# Patient Record
Sex: MALE | Race: WHITE | NOT HISPANIC OR LATINO | Employment: FULL TIME | URBAN - METROPOLITAN AREA
[De-identification: names, ages, dates, MRNs, and addresses within clinical notes are randomized per-mention and may not be internally consistent; named-entity substitution may affect disease eponyms.]

---

## 2017-02-10 ENCOUNTER — APPOINTMENT (OUTPATIENT)
Dept: LAB | Facility: HOSPITAL | Age: 52
End: 2017-02-10
Payer: COMMERCIAL

## 2017-02-10 ENCOUNTER — TRANSCRIBE ORDERS (OUTPATIENT)
Dept: ADMINISTRATIVE | Facility: HOSPITAL | Age: 52
End: 2017-02-10

## 2017-02-10 DIAGNOSIS — D50.9 IRON DEFICIENCY ANEMIA, UNSPECIFIED: ICD-10-CM

## 2017-02-10 DIAGNOSIS — E78.5 HYPERLIPIDEMIA, UNSPECIFIED HYPERLIPIDEMIA TYPE: ICD-10-CM

## 2017-02-10 DIAGNOSIS — E13.8 DIABETES MELLITUS OF OTHER TYPE WITH COMPLICATION: Primary | ICD-10-CM

## 2017-02-10 DIAGNOSIS — E13.8 DIABETES MELLITUS OF OTHER TYPE WITH COMPLICATION: ICD-10-CM

## 2017-02-10 DIAGNOSIS — E03.9 UNSPECIFIED HYPOTHYROIDISM: ICD-10-CM

## 2017-02-10 LAB
ALBUMIN SERPL BCP-MCNC: 3.2 G/DL (ref 3.5–5)
ALP SERPL-CCNC: 60 U/L (ref 46–116)
ALT SERPL W P-5'-P-CCNC: 34 U/L (ref 12–78)
ANION GAP SERPL CALCULATED.3IONS-SCNC: 9 MMOL/L (ref 4–13)
AST SERPL W P-5'-P-CCNC: 26 U/L (ref 5–45)
BACTERIA UR QL AUTO: ABNORMAL /HPF
BASOPHILS # BLD AUTO: 0.02 THOUSANDS/ΜL (ref 0–0.1)
BASOPHILS NFR BLD AUTO: 0 % (ref 0–1)
BILIRUB DIRECT SERPL-MCNC: 0.08 MG/DL (ref 0–0.2)
BILIRUB SERPL-MCNC: 0.4 MG/DL (ref 0.2–1)
BILIRUB UR QL STRIP: NEGATIVE
BUN SERPL-MCNC: 21 MG/DL (ref 5–25)
CALCIUM SERPL-MCNC: 8.9 MG/DL (ref 8.3–10.1)
CHLORIDE SERPL-SCNC: 102 MMOL/L (ref 100–108)
CHOLEST SERPL-MCNC: 207 MG/DL (ref 50–200)
CLARITY UR: CLEAR
CO2 SERPL-SCNC: 27 MMOL/L (ref 21–32)
COLOR UR: YELLOW
CREAT SERPL-MCNC: 0.86 MG/DL (ref 0.6–1.3)
EOSINOPHIL # BLD AUTO: 0.14 THOUSAND/ΜL (ref 0–0.61)
EOSINOPHIL NFR BLD AUTO: 2 % (ref 0–6)
ERYTHROCYTE [DISTWIDTH] IN BLOOD BY AUTOMATED COUNT: 13.4 % (ref 11.6–15.1)
EST. AVERAGE GLUCOSE BLD GHB EST-MCNC: 232 MG/DL
FERRITIN SERPL-MCNC: 110 NG/ML (ref 8–388)
GFR SERPL CREATININE-BSD FRML MDRD: >60 ML/MIN/1.73SQ M
GLUCOSE SERPL-MCNC: 226 MG/DL (ref 65–140)
GLUCOSE UR STRIP-MCNC: ABNORMAL MG/DL
HBA1C MFR BLD: 9.7 % (ref 4.2–6.3)
HCT VFR BLD AUTO: 42.7 % (ref 36.5–49.3)
HDLC SERPL-MCNC: 51 MG/DL (ref 40–60)
HGB BLD-MCNC: 14.2 G/DL (ref 12–17)
HGB UR QL STRIP.AUTO: NEGATIVE
IRON SATN MFR SERPL: 19 %
IRON SERPL-MCNC: 62 UG/DL (ref 65–175)
KETONES UR STRIP-MCNC: ABNORMAL MG/DL
LDLC SERPL CALC-MCNC: 116 MG/DL (ref 0–100)
LEUKOCYTE ESTERASE UR QL STRIP: NEGATIVE
LYMPHOCYTES # BLD AUTO: 1.57 THOUSANDS/ΜL (ref 0.6–4.47)
LYMPHOCYTES NFR BLD AUTO: 23 % (ref 14–44)
MCH RBC QN AUTO: 29.6 PG (ref 26.8–34.3)
MCHC RBC AUTO-ENTMCNC: 33.3 G/DL (ref 31.4–37.4)
MCV RBC AUTO: 89 FL (ref 82–98)
MONOCYTES # BLD AUTO: 0.39 THOUSAND/ΜL (ref 0.17–1.22)
MONOCYTES NFR BLD AUTO: 6 % (ref 4–12)
MUCOUS THREADS UR QL AUTO: ABNORMAL
NEUTROPHILS # BLD AUTO: 4.51 THOUSANDS/ΜL (ref 1.85–7.62)
NEUTS SEG NFR BLD AUTO: 67 % (ref 43–75)
NITRITE UR QL STRIP: NEGATIVE
NON-SQ EPI CELLS URNS QL MICRO: ABNORMAL /HPF
NRBC BLD AUTO-RTO: 0 /100 WBCS
PH UR STRIP.AUTO: 5.5 [PH] (ref 4.5–8)
PLATELET # BLD AUTO: 187 THOUSANDS/UL (ref 149–390)
PMV BLD AUTO: 10.4 FL (ref 8.9–12.7)
POTASSIUM SERPL-SCNC: 4.6 MMOL/L (ref 3.5–5.3)
PROT SERPL-MCNC: 7 G/DL (ref 6.4–8.2)
PROT UR STRIP-MCNC: ABNORMAL MG/DL
PSA SERPL-MCNC: 0.8 NG/ML (ref 0–4)
RBC # BLD AUTO: 4.79 MILLION/UL (ref 3.88–5.62)
RBC #/AREA URNS AUTO: ABNORMAL /HPF
SODIUM SERPL-SCNC: 138 MMOL/L (ref 136–145)
SP GR UR STRIP.AUTO: >=1.03 (ref 1–1.03)
TIBC SERPL-MCNC: 318 UG/DL (ref 250–450)
TRIGL SERPL-MCNC: 200 MG/DL
TSH SERPL DL<=0.05 MIU/L-ACNC: 1.64 UIU/ML (ref 0.36–3.74)
UROBILINOGEN UR QL STRIP.AUTO: 0.2 E.U./DL
WBC # BLD AUTO: 6.7 THOUSAND/UL (ref 4.31–10.16)
WBC #/AREA URNS AUTO: ABNORMAL /HPF

## 2017-02-10 PROCEDURE — 82570 ASSAY OF URINE CREATININE: CPT | Performed by: INTERNAL MEDICINE

## 2017-02-10 PROCEDURE — 84443 ASSAY THYROID STIM HORMONE: CPT

## 2017-02-10 PROCEDURE — 80061 LIPID PANEL: CPT

## 2017-02-10 PROCEDURE — 83036 HEMOGLOBIN GLYCOSYLATED A1C: CPT

## 2017-02-10 PROCEDURE — 85025 COMPLETE CBC W/AUTO DIFF WBC: CPT

## 2017-02-10 PROCEDURE — 36415 COLL VENOUS BLD VENIPUNCTURE: CPT

## 2017-02-10 PROCEDURE — 80048 BASIC METABOLIC PNL TOTAL CA: CPT

## 2017-02-10 PROCEDURE — 82728 ASSAY OF FERRITIN: CPT

## 2017-02-10 PROCEDURE — 82043 UR ALBUMIN QUANTITATIVE: CPT | Performed by: INTERNAL MEDICINE

## 2017-02-10 PROCEDURE — G0103 PSA SCREENING: HCPCS

## 2017-02-10 PROCEDURE — 80076 HEPATIC FUNCTION PANEL: CPT

## 2017-02-10 PROCEDURE — 83540 ASSAY OF IRON: CPT

## 2017-02-10 PROCEDURE — 83550 IRON BINDING TEST: CPT

## 2017-02-10 PROCEDURE — 81001 URINALYSIS AUTO W/SCOPE: CPT | Performed by: INTERNAL MEDICINE

## 2017-02-11 LAB
CREAT UR-MCNC: 141 MG/DL
MICROALBUMIN UR-MCNC: 217 MG/L (ref 0–20)
MICROALBUMIN/CREAT 24H UR: 154 MG/G CREATININE (ref 0–30)

## 2018-01-17 ENCOUNTER — ALLSCRIPTS OFFICE VISIT (OUTPATIENT)
Dept: OTHER | Facility: OTHER | Age: 53
End: 2018-01-17

## 2018-01-23 NOTE — PROCEDURES
Results/Data    Procedure: Electromyogram and Nerve Conduction Study  Indication: Left Upper Extremity   Referred by Dr Addy Hess  The procedure's were discussed with the patient  Written consent was obtained prior to the procedure and is detailed in the patient's record  Prior to the start of the procedure a time out was taken and the identity of the patient was confirmed via name and date of birth with the patient  The correct site and the procedure to be performed were confirmed  The correct side was confirmed if applicable  The positioning of the patient was verified  The availability of the correct equipment was verified  Procedure Start Time: 11:30    Technique: A sterile concentric needle electrode was used  The patient tolerated the procedure well  There were no complications  Results   : The median motor terminal latency was prolonged with a normal compound motor action potential amplitude and a slow conduction velocity across the wrist   The ulnar motor terminal latency was within normal limits with a normal compound motor action potential amplitude and a slow conduction velocity distally and across the elbow  The median and ulnar F-waves were prolonged  The median sensory peak latency was prolonged with a low sensory action potential amplitude  The ulnar sensory peak latency was normal with normal sensory action potential amplitude  The median palmar evoked response was prolonged by 3 0 milliseconds as compared to the ulnar palmar evoked response at the  same distance  Concentric needle examination was performed on various proximal and distal muscles including deltoid, biceps, triceps, FCU, apb, FDI and low cervical paraspinals  There was no evidence of active denervation in any of the muscles tested    Mild decreased recruitment of giant and polyphasic motor units was noted in the abductor pollicis brevis and mild decreased recruitment of polyphasic motor units was noted in the FDI  The compound motor unit action potentials were of normal configuration with interference patterns being full or full for effort in the remaining muscles tested  Interpretation:  There is electrophysiologic evidence of a:    1  Moderate median nerve compression neuropathy at the wrist with demyelinative changes, consistent with a diagnosis of carpal tunnel syndrome  2  Mild ulnar neuropathy at the elbow with demyelinating changes as evidence by the slowing of conduction velocity across the elbow  In addition, moderate ulnar neuropathy at the wrist with demyelinative changes as evidence by the abnormal ulnar motor and sensory nerve conduction studies and chronic denervation changes in the FDI  3   There is no evidence of a cervical radiculopathy  Clinical correlation is recommended        Signatures   Electronically signed by : Marcos Grimes MD; Jan 17 2018 12:34PM EST                       (Author)

## 2018-10-06 ENCOUNTER — APPOINTMENT (OUTPATIENT)
Dept: LAB | Facility: HOSPITAL | Age: 53
End: 2018-10-06
Payer: COMMERCIAL

## 2018-10-06 ENCOUNTER — TRANSCRIBE ORDERS (OUTPATIENT)
Dept: ADMINISTRATIVE | Facility: HOSPITAL | Age: 53
End: 2018-10-06

## 2018-10-06 DIAGNOSIS — E13.8 DIABETES MELLITUS OF OTHER TYPE WITH COMPLICATION, UNSPECIFIED WHETHER LONG TERM INSULIN USE: Primary | ICD-10-CM

## 2018-10-06 DIAGNOSIS — E78.5 HYPERLIPIDEMIA, UNSPECIFIED HYPERLIPIDEMIA TYPE: ICD-10-CM

## 2018-10-06 DIAGNOSIS — E13.8 DIABETES MELLITUS OF OTHER TYPE WITH COMPLICATION, UNSPECIFIED WHETHER LONG TERM INSULIN USE: ICD-10-CM

## 2018-10-06 LAB
ALBUMIN SERPL BCP-MCNC: 3.9 G/DL (ref 3.5–5)
ALP SERPL-CCNC: 74 U/L (ref 46–116)
ALT SERPL W P-5'-P-CCNC: 31 U/L (ref 12–78)
ANION GAP SERPL CALCULATED.3IONS-SCNC: 9 MMOL/L (ref 4–13)
AST SERPL W P-5'-P-CCNC: 12 U/L (ref 5–45)
BILIRUB DIRECT SERPL-MCNC: 0.11 MG/DL (ref 0–0.2)
BILIRUB SERPL-MCNC: 0.4 MG/DL (ref 0.2–1)
BUN SERPL-MCNC: 15 MG/DL (ref 5–25)
CALCIUM SERPL-MCNC: 9.2 MG/DL (ref 8.3–10.1)
CHLORIDE SERPL-SCNC: 100 MMOL/L (ref 100–108)
CHOLEST SERPL-MCNC: 219 MG/DL (ref 50–200)
CO2 SERPL-SCNC: 26 MMOL/L (ref 21–32)
CREAT SERPL-MCNC: 0.87 MG/DL (ref 0.6–1.3)
CREAT UR-MCNC: 52.9 MG/DL
EST. AVERAGE GLUCOSE BLD GHB EST-MCNC: 203 MG/DL
GFR SERPL CREATININE-BSD FRML MDRD: 99 ML/MIN/1.73SQ M
GLUCOSE P FAST SERPL-MCNC: 183 MG/DL (ref 65–99)
HBA1C MFR BLD: 8.7 % (ref 4.2–6.3)
HDLC SERPL-MCNC: 58 MG/DL (ref 40–60)
LDLC SERPL CALC-MCNC: 125 MG/DL (ref 0–100)
MICROALBUMIN UR-MCNC: 92.2 MG/L (ref 0–20)
MICROALBUMIN/CREAT 24H UR: 174 MG/G CREATININE (ref 0–30)
NONHDLC SERPL-MCNC: 161 MG/DL
POTASSIUM SERPL-SCNC: 4.8 MMOL/L (ref 3.5–5.3)
PROT SERPL-MCNC: 8 G/DL (ref 6.4–8.2)
SODIUM SERPL-SCNC: 135 MMOL/L (ref 136–145)
TRIGL SERPL-MCNC: 181 MG/DL

## 2018-10-06 PROCEDURE — 36415 COLL VENOUS BLD VENIPUNCTURE: CPT

## 2018-10-06 PROCEDURE — 80076 HEPATIC FUNCTION PANEL: CPT

## 2018-10-06 PROCEDURE — 80048 BASIC METABOLIC PNL TOTAL CA: CPT

## 2018-10-06 PROCEDURE — 83036 HEMOGLOBIN GLYCOSYLATED A1C: CPT

## 2018-10-06 PROCEDURE — 82043 UR ALBUMIN QUANTITATIVE: CPT | Performed by: INTERNAL MEDICINE

## 2018-10-06 PROCEDURE — 82570 ASSAY OF URINE CREATININE: CPT | Performed by: INTERNAL MEDICINE

## 2018-10-06 PROCEDURE — 80061 LIPID PANEL: CPT

## 2019-02-09 ENCOUNTER — APPOINTMENT (OUTPATIENT)
Dept: LAB | Facility: HOSPITAL | Age: 54
End: 2019-02-09
Payer: COMMERCIAL

## 2019-02-09 ENCOUNTER — TRANSCRIBE ORDERS (OUTPATIENT)
Dept: ADMINISTRATIVE | Facility: HOSPITAL | Age: 54
End: 2019-02-09

## 2019-02-09 DIAGNOSIS — N52.8 MIXED ERECTILE DYSFUNCTION: Primary | ICD-10-CM

## 2019-02-09 DIAGNOSIS — N52.8 MIXED ERECTILE DYSFUNCTION: ICD-10-CM

## 2019-02-09 PROCEDURE — 84402 ASSAY OF FREE TESTOSTERONE: CPT

## 2019-02-09 PROCEDURE — 36415 COLL VENOUS BLD VENIPUNCTURE: CPT

## 2019-02-09 PROCEDURE — 84403 ASSAY OF TOTAL TESTOSTERONE: CPT

## 2019-02-12 LAB
TESTOST FREE SERPL-MCNC: 6.2 PG/ML (ref 7.2–24)
TESTOST SERPL-MCNC: 296 NG/DL (ref 264–916)

## 2019-05-18 ENCOUNTER — TRANSCRIBE ORDERS (OUTPATIENT)
Dept: ADMINISTRATIVE | Facility: HOSPITAL | Age: 54
End: 2019-05-18

## 2019-05-18 ENCOUNTER — APPOINTMENT (OUTPATIENT)
Dept: LAB | Facility: HOSPITAL | Age: 54
End: 2019-05-18
Payer: COMMERCIAL

## 2019-05-18 DIAGNOSIS — E03.9 MYXEDEMA HEART DISEASE: ICD-10-CM

## 2019-05-18 DIAGNOSIS — Z12.5 SPECIAL SCREENING FOR MALIGNANT NEOPLASM OF PROSTATE: ICD-10-CM

## 2019-05-18 DIAGNOSIS — E11.9 DIABETES MELLITUS WITHOUT COMPLICATION (HCC): ICD-10-CM

## 2019-05-18 DIAGNOSIS — E11.9 DIABETES MELLITUS WITHOUT COMPLICATION (HCC): Primary | ICD-10-CM

## 2019-05-18 DIAGNOSIS — N52.8 MIXED ERECTILE DYSFUNCTION: ICD-10-CM

## 2019-05-18 DIAGNOSIS — E78.00 PURE HYPERCHOLESTEROLEMIA: ICD-10-CM

## 2019-05-18 DIAGNOSIS — I51.9 MYXEDEMA HEART DISEASE: ICD-10-CM

## 2019-05-18 LAB
ALBUMIN SERPL BCP-MCNC: 3.5 G/DL (ref 3.5–5)
ALP SERPL-CCNC: 65 U/L (ref 46–116)
ALT SERPL W P-5'-P-CCNC: 26 U/L (ref 12–78)
ANION GAP SERPL CALCULATED.3IONS-SCNC: 8 MMOL/L (ref 4–13)
AST SERPL W P-5'-P-CCNC: 12 U/L (ref 5–45)
BACTERIA UR QL AUTO: NORMAL /HPF
BASOPHILS # BLD AUTO: 0.02 THOUSANDS/ΜL (ref 0–0.1)
BASOPHILS NFR BLD AUTO: 0 % (ref 0–1)
BILIRUB DIRECT SERPL-MCNC: 0.1 MG/DL (ref 0–0.2)
BILIRUB SERPL-MCNC: 0.4 MG/DL (ref 0.2–1)
BILIRUB UR QL STRIP: NEGATIVE
BUN SERPL-MCNC: 19 MG/DL (ref 5–25)
CALCIUM SERPL-MCNC: 9.2 MG/DL (ref 8.3–10.1)
CHLORIDE SERPL-SCNC: 103 MMOL/L (ref 100–108)
CHOLEST SERPL-MCNC: 200 MG/DL (ref 50–200)
CLARITY UR: CLEAR
CO2 SERPL-SCNC: 27 MMOL/L (ref 21–32)
COLOR UR: YELLOW
CREAT SERPL-MCNC: 0.82 MG/DL (ref 0.6–1.3)
CREAT UR-MCNC: 53.2 MG/DL
EOSINOPHIL # BLD AUTO: 0.1 THOUSAND/ΜL (ref 0–0.61)
EOSINOPHIL NFR BLD AUTO: 2 % (ref 0–6)
ERYTHROCYTE [DISTWIDTH] IN BLOOD BY AUTOMATED COUNT: 13.7 % (ref 11.6–15.1)
EST. AVERAGE GLUCOSE BLD GHB EST-MCNC: 209 MG/DL
FERRITIN SERPL-MCNC: 88 NG/ML (ref 8–388)
GFR SERPL CREATININE-BSD FRML MDRD: 101 ML/MIN/1.73SQ M
GLUCOSE P FAST SERPL-MCNC: 176 MG/DL (ref 65–99)
GLUCOSE UR STRIP-MCNC: ABNORMAL MG/DL
HBA1C MFR BLD: 8.9 % (ref 4.2–6.3)
HCT VFR BLD AUTO: 47.4 % (ref 36.5–49.3)
HDLC SERPL-MCNC: 51 MG/DL (ref 40–60)
HGB BLD-MCNC: 15.4 G/DL (ref 12–17)
HGB UR QL STRIP.AUTO: NEGATIVE
IMM GRANULOCYTES # BLD AUTO: 0.06 THOUSAND/UL (ref 0–0.2)
IMM GRANULOCYTES NFR BLD AUTO: 1 % (ref 0–2)
IRON SATN MFR SERPL: 19 %
IRON SERPL-MCNC: 63 UG/DL (ref 65–175)
KETONES UR STRIP-MCNC: NEGATIVE MG/DL
LDLC SERPL CALC-MCNC: 103 MG/DL (ref 0–100)
LEUKOCYTE ESTERASE UR QL STRIP: ABNORMAL
LYMPHOCYTES # BLD AUTO: 1.77 THOUSANDS/ΜL (ref 0.6–4.47)
LYMPHOCYTES NFR BLD AUTO: 26 % (ref 14–44)
MCH RBC QN AUTO: 29.9 PG (ref 26.8–34.3)
MCHC RBC AUTO-ENTMCNC: 32.5 G/DL (ref 31.4–37.4)
MCV RBC AUTO: 92 FL (ref 82–98)
MICROALBUMIN UR-MCNC: 112 MG/L (ref 0–20)
MICROALBUMIN/CREAT 24H UR: 211 MG/G CREATININE (ref 0–30)
MONOCYTES # BLD AUTO: 0.47 THOUSAND/ΜL (ref 0.17–1.22)
MONOCYTES NFR BLD AUTO: 7 % (ref 4–12)
NEUTROPHILS # BLD AUTO: 4.42 THOUSANDS/ΜL (ref 1.85–7.62)
NEUTS SEG NFR BLD AUTO: 64 % (ref 43–75)
NITRITE UR QL STRIP: NEGATIVE
NON-SQ EPI CELLS URNS QL MICRO: NORMAL /HPF
NONHDLC SERPL-MCNC: 149 MG/DL
NRBC BLD AUTO-RTO: 0 /100 WBCS
PH UR STRIP.AUTO: 5.5 [PH]
PLATELET # BLD AUTO: 179 THOUSANDS/UL (ref 149–390)
PMV BLD AUTO: 10.8 FL (ref 8.9–12.7)
POTASSIUM SERPL-SCNC: 4.8 MMOL/L (ref 3.5–5.3)
PROT SERPL-MCNC: 7.4 G/DL (ref 6.4–8.2)
PROT UR STRIP-MCNC: NEGATIVE MG/DL
PSA SERPL-MCNC: 1 NG/ML (ref 0–4)
RBC # BLD AUTO: 5.15 MILLION/UL (ref 3.88–5.62)
RBC #/AREA URNS AUTO: NORMAL /HPF
SODIUM SERPL-SCNC: 138 MMOL/L (ref 136–145)
SP GR UR STRIP.AUTO: 1.02 (ref 1–1.03)
T4 FREE SERPL-MCNC: 1.04 NG/DL (ref 0.76–1.46)
TIBC SERPL-MCNC: 339 UG/DL (ref 250–450)
TRIGL SERPL-MCNC: 229 MG/DL
TSH SERPL DL<=0.05 MIU/L-ACNC: 1.17 UIU/ML (ref 0.36–3.74)
UROBILINOGEN UR QL STRIP.AUTO: 0.2 E.U./DL
WBC # BLD AUTO: 6.84 THOUSAND/UL (ref 4.31–10.16)
WBC #/AREA URNS AUTO: NORMAL /HPF

## 2019-05-18 PROCEDURE — 83540 ASSAY OF IRON: CPT

## 2019-05-18 PROCEDURE — 84402 ASSAY OF FREE TESTOSTERONE: CPT

## 2019-05-18 PROCEDURE — 84439 ASSAY OF FREE THYROXINE: CPT

## 2019-05-18 PROCEDURE — 80061 LIPID PANEL: CPT

## 2019-05-18 PROCEDURE — 85025 COMPLETE CBC W/AUTO DIFF WBC: CPT

## 2019-05-18 PROCEDURE — 84153 ASSAY OF PSA TOTAL: CPT

## 2019-05-18 PROCEDURE — 36415 COLL VENOUS BLD VENIPUNCTURE: CPT

## 2019-05-18 PROCEDURE — 80076 HEPATIC FUNCTION PANEL: CPT

## 2019-05-18 PROCEDURE — 82043 UR ALBUMIN QUANTITATIVE: CPT | Performed by: INTERNAL MEDICINE

## 2019-05-18 PROCEDURE — 82570 ASSAY OF URINE CREATININE: CPT | Performed by: INTERNAL MEDICINE

## 2019-05-18 PROCEDURE — 84403 ASSAY OF TOTAL TESTOSTERONE: CPT

## 2019-05-18 PROCEDURE — 83550 IRON BINDING TEST: CPT

## 2019-05-18 PROCEDURE — 83036 HEMOGLOBIN GLYCOSYLATED A1C: CPT

## 2019-05-18 PROCEDURE — 84443 ASSAY THYROID STIM HORMONE: CPT

## 2019-05-18 PROCEDURE — 81001 URINALYSIS AUTO W/SCOPE: CPT | Performed by: INTERNAL MEDICINE

## 2019-05-18 PROCEDURE — 80048 BASIC METABOLIC PNL TOTAL CA: CPT

## 2019-05-18 PROCEDURE — 82728 ASSAY OF FERRITIN: CPT

## 2019-05-21 LAB
TESTOST FREE SERPL-MCNC: 7.6 PG/ML (ref 7.2–24)
TESTOST SERPL-MCNC: 216 NG/DL (ref 264–916)

## 2019-08-30 ENCOUNTER — APPOINTMENT (OUTPATIENT)
Dept: LAB | Facility: HOSPITAL | Age: 54
End: 2019-08-30
Payer: COMMERCIAL

## 2019-08-30 ENCOUNTER — TRANSCRIBE ORDERS (OUTPATIENT)
Dept: ADMINISTRATIVE | Facility: HOSPITAL | Age: 54
End: 2019-08-30

## 2019-08-30 DIAGNOSIS — N52.8 MIXED ERECTILE DYSFUNCTION: ICD-10-CM

## 2019-08-30 DIAGNOSIS — E11.9 DIABETES MELLITUS WITHOUT COMPLICATION (HCC): ICD-10-CM

## 2019-08-30 DIAGNOSIS — E78.5 HYPERLIPOPROTEINEMIA: ICD-10-CM

## 2019-08-30 DIAGNOSIS — E11.9 DIABETES MELLITUS WITHOUT COMPLICATION (HCC): Primary | ICD-10-CM

## 2019-08-30 LAB
ALBUMIN SERPL BCP-MCNC: 3.4 G/DL (ref 3.5–5)
ALP SERPL-CCNC: 61 U/L (ref 46–116)
ALT SERPL W P-5'-P-CCNC: 18 U/L (ref 12–78)
ANION GAP SERPL CALCULATED.3IONS-SCNC: 9 MMOL/L (ref 4–13)
AST SERPL W P-5'-P-CCNC: 14 U/L (ref 5–45)
BILIRUB DIRECT SERPL-MCNC: 0.09 MG/DL (ref 0–0.2)
BILIRUB SERPL-MCNC: 0.4 MG/DL (ref 0.2–1)
BUN SERPL-MCNC: 24 MG/DL (ref 5–25)
CALCIUM SERPL-MCNC: 8.8 MG/DL (ref 8.3–10.1)
CHLORIDE SERPL-SCNC: 101 MMOL/L (ref 100–108)
CHOLEST SERPL-MCNC: 164 MG/DL (ref 50–200)
CO2 SERPL-SCNC: 25 MMOL/L (ref 21–32)
CREAT SERPL-MCNC: 0.72 MG/DL (ref 0.6–1.3)
CREAT UR-MCNC: 103 MG/DL
EST. AVERAGE GLUCOSE BLD GHB EST-MCNC: 192 MG/DL
GFR SERPL CREATININE-BSD FRML MDRD: 107 ML/MIN/1.73SQ M
GLUCOSE P FAST SERPL-MCNC: 179 MG/DL (ref 65–99)
HBA1C MFR BLD: 8.3 % (ref 4.2–6.3)
HDLC SERPL-MCNC: 47 MG/DL (ref 40–60)
LDLC SERPL CALC-MCNC: 83 MG/DL (ref 0–100)
MICROALBUMIN UR-MCNC: 101 MG/L (ref 0–20)
MICROALBUMIN/CREAT 24H UR: 98 MG/G CREATININE (ref 0–30)
NONHDLC SERPL-MCNC: 117 MG/DL
POTASSIUM SERPL-SCNC: 4.3 MMOL/L (ref 3.5–5.3)
PROT SERPL-MCNC: 7.1 G/DL (ref 6.4–8.2)
SODIUM SERPL-SCNC: 135 MMOL/L (ref 136–145)
TRIGL SERPL-MCNC: 168 MG/DL

## 2019-08-30 PROCEDURE — 36415 COLL VENOUS BLD VENIPUNCTURE: CPT

## 2019-08-30 PROCEDURE — 82570 ASSAY OF URINE CREATININE: CPT | Performed by: INTERNAL MEDICINE

## 2019-08-30 PROCEDURE — 80061 LIPID PANEL: CPT

## 2019-08-30 PROCEDURE — 82043 UR ALBUMIN QUANTITATIVE: CPT | Performed by: INTERNAL MEDICINE

## 2019-08-30 PROCEDURE — 84403 ASSAY OF TOTAL TESTOSTERONE: CPT

## 2019-08-30 PROCEDURE — 80048 BASIC METABOLIC PNL TOTAL CA: CPT

## 2019-08-30 PROCEDURE — 83036 HEMOGLOBIN GLYCOSYLATED A1C: CPT

## 2019-08-30 PROCEDURE — 84402 ASSAY OF FREE TESTOSTERONE: CPT

## 2019-08-30 PROCEDURE — 80076 HEPATIC FUNCTION PANEL: CPT

## 2019-08-31 LAB
TESTOST FREE SERPL-MCNC: 9.6 PG/ML (ref 7.2–24)
TESTOST SERPL-MCNC: 315 NG/DL (ref 264–916)

## 2020-11-21 ENCOUNTER — TRANSCRIBE ORDERS (OUTPATIENT)
Dept: ADMINISTRATIVE | Facility: HOSPITAL | Age: 55
End: 2020-11-21

## 2020-11-21 ENCOUNTER — LAB (OUTPATIENT)
Dept: LAB | Facility: HOSPITAL | Age: 55
End: 2020-11-21
Payer: COMMERCIAL

## 2020-11-21 DIAGNOSIS — E13.69 OTHER SPECIFIED DIABETES MELLITUS WITH OTHER SPECIFIED COMPLICATION, UNSPECIFIED WHETHER LONG TERM INSULIN USE (HCC): Primary | ICD-10-CM

## 2020-11-21 DIAGNOSIS — E13.69 OTHER SPECIFIED DIABETES MELLITUS WITH OTHER SPECIFIED COMPLICATION, UNSPECIFIED WHETHER LONG TERM INSULIN USE (HCC): ICD-10-CM

## 2020-11-21 DIAGNOSIS — E78.5 HYPERLIPIDEMIA, UNSPECIFIED HYPERLIPIDEMIA TYPE: ICD-10-CM

## 2020-11-21 DIAGNOSIS — N52.8 MIXED ERECTILE DYSFUNCTION: ICD-10-CM

## 2020-11-21 DIAGNOSIS — N52.8 MIXED ERECTILE DYSFUNCTION: Primary | ICD-10-CM

## 2020-11-21 LAB
ALBUMIN SERPL BCP-MCNC: 3.6 G/DL (ref 3.5–5)
ALP SERPL-CCNC: 67 U/L (ref 46–116)
ALT SERPL W P-5'-P-CCNC: 36 U/L (ref 12–78)
ANION GAP SERPL CALCULATED.3IONS-SCNC: 9 MMOL/L (ref 4–13)
AST SERPL W P-5'-P-CCNC: 14 U/L (ref 5–45)
BILIRUB DIRECT SERPL-MCNC: 0.13 MG/DL (ref 0–0.2)
BILIRUB SERPL-MCNC: 0.5 MG/DL (ref 0.2–1)
BUN SERPL-MCNC: 20 MG/DL (ref 5–25)
CALCIUM SERPL-MCNC: 9.1 MG/DL (ref 8.3–10.1)
CHLORIDE SERPL-SCNC: 101 MMOL/L (ref 100–108)
CHOLEST SERPL-MCNC: 190 MG/DL (ref 50–200)
CO2 SERPL-SCNC: 27 MMOL/L (ref 21–32)
CREAT SERPL-MCNC: 0.85 MG/DL (ref 0.6–1.3)
CREAT UR-MCNC: 95.6 MG/DL
EST. AVERAGE GLUCOSE BLD GHB EST-MCNC: 246 MG/DL
GFR SERPL CREATININE-BSD FRML MDRD: 98 ML/MIN/1.73SQ M
GLUCOSE P FAST SERPL-MCNC: 231 MG/DL (ref 65–99)
HBA1C MFR BLD: 10.2 %
HDLC SERPL-MCNC: 54 MG/DL
LDLC SERPL CALC-MCNC: 100 MG/DL (ref 0–100)
MICROALBUMIN UR-MCNC: 129 MG/L (ref 0–20)
MICROALBUMIN/CREAT 24H UR: 135 MG/G CREATININE (ref 0–30)
POTASSIUM SERPL-SCNC: 4.5 MMOL/L (ref 3.5–5.3)
PROT SERPL-MCNC: 7.4 G/DL (ref 6.4–8.2)
SODIUM SERPL-SCNC: 137 MMOL/L (ref 136–145)
TRIGL SERPL-MCNC: 182 MG/DL

## 2020-11-21 PROCEDURE — 83036 HEMOGLOBIN GLYCOSYLATED A1C: CPT

## 2020-11-21 PROCEDURE — 82043 UR ALBUMIN QUANTITATIVE: CPT | Performed by: INTERNAL MEDICINE

## 2020-11-21 PROCEDURE — 84402 ASSAY OF FREE TESTOSTERONE: CPT

## 2020-11-21 PROCEDURE — 36415 COLL VENOUS BLD VENIPUNCTURE: CPT

## 2020-11-21 PROCEDURE — 80076 HEPATIC FUNCTION PANEL: CPT

## 2020-11-21 PROCEDURE — 80048 BASIC METABOLIC PNL TOTAL CA: CPT

## 2020-11-21 PROCEDURE — 82570 ASSAY OF URINE CREATININE: CPT | Performed by: INTERNAL MEDICINE

## 2020-11-21 PROCEDURE — 84403 ASSAY OF TOTAL TESTOSTERONE: CPT

## 2020-11-21 PROCEDURE — 80061 LIPID PANEL: CPT

## 2020-11-24 LAB
TESTOST FREE SERPL-MCNC: 11.9 PG/ML (ref 7.2–24)
TESTOST SERPL-MCNC: 237 NG/DL (ref 264–916)

## 2021-01-25 ENCOUNTER — NURSE TRIAGE (OUTPATIENT)
Dept: OTHER | Facility: OTHER | Age: 56
End: 2021-01-25

## 2021-01-25 DIAGNOSIS — Z20.828 SARS-ASSOCIATED CORONAVIRUS EXPOSURE: Primary | ICD-10-CM

## 2021-01-25 NOTE — TELEPHONE ENCOUNTER
1  Were you within 6 feet or less, for up to 15 minutes or more with a person that has a confirmed COVID-19 test?   Daughter tested positive Saturday  She became symptomatic on 1/22/2021    2  What was the date of your exposure? Lives in same house  3  Are you experiencing any symptoms attributed to the virus?  (Assess for SOB, cough, fever, difficulty breathing)   Asymptomatic  4  HIGH RISK: Do you have any history heart or lung conditions, weakened immune system, diabetes, Asthma, CHF, HIV, COPD, Chemo, renal failure, sickle cell, etc?   Type II Diabetes

## 2021-01-25 NOTE — TELEPHONE ENCOUNTER
Regarding: Covid- exposure   ----- Message from Ramo Claire sent at 1/25/2021  9:30 AM EST -----  " I am calling to see if I can get a covid test as my daughter who lives in our house has tested postive on Saturday   I know you have to wait 4 or 5 days to get tested but I was wondering if I could get a script put in to get tested for later this week "

## 2021-01-25 NOTE — TELEPHONE ENCOUNTER
Reason for Disposition   [1] COVID-19 EXPOSURE (Close Contact) AND [2] within last 14 days BUT [3] NO symptoms    Protocols used: CORONAVIRUS (COVID-19) EXPOSURE-ADULT-[place]

## 2022-01-28 ENCOUNTER — APPOINTMENT (OUTPATIENT)
Dept: LAB | Facility: HOSPITAL | Age: 57
End: 2022-01-28
Payer: COMMERCIAL

## 2022-01-28 DIAGNOSIS — E04.1 NONTOXIC UNINODULAR GOITER: ICD-10-CM

## 2022-01-28 DIAGNOSIS — I10 ESSENTIAL HYPERTENSION, MALIGNANT: ICD-10-CM

## 2022-01-28 DIAGNOSIS — N52.8 MIXED ERECTILE DYSFUNCTION: ICD-10-CM

## 2022-01-28 DIAGNOSIS — Z12.5 SPECIAL SCREENING FOR MALIGNANT NEOPLASM OF PROSTATE: Primary | ICD-10-CM

## 2022-01-28 DIAGNOSIS — E78.5 HYPERLIPIDEMIA, UNSPECIFIED HYPERLIPIDEMIA TYPE: ICD-10-CM

## 2022-01-28 DIAGNOSIS — E13.69 OTHER SPECIFIED DIABETES MELLITUS WITH OTHER SPECIFIED COMPLICATION, UNSPECIFIED WHETHER LONG TERM INSULIN USE (HCC): ICD-10-CM

## 2022-01-28 DIAGNOSIS — E11.65 UNCONTROLLED TYPE 2 DIABETES MELLITUS WITH HYPERGLYCEMIA (HCC): ICD-10-CM

## 2022-01-28 LAB
ALBUMIN SERPL BCP-MCNC: 3.7 G/DL (ref 3.5–5)
ALP SERPL-CCNC: 62 U/L (ref 46–116)
ALT SERPL W P-5'-P-CCNC: 32 U/L (ref 12–78)
ANION GAP SERPL CALCULATED.3IONS-SCNC: 8 MMOL/L (ref 4–13)
AST SERPL W P-5'-P-CCNC: 16 U/L (ref 5–45)
BILIRUB DIRECT SERPL-MCNC: 0.07 MG/DL (ref 0–0.2)
BILIRUB SERPL-MCNC: 0.37 MG/DL (ref 0.2–1)
BUN SERPL-MCNC: 25 MG/DL (ref 5–25)
CALCIUM SERPL-MCNC: 9.1 MG/DL (ref 8.3–10.1)
CHLORIDE SERPL-SCNC: 100 MMOL/L (ref 100–108)
CHOLEST SERPL-MCNC: 185 MG/DL
CO2 SERPL-SCNC: 28 MMOL/L (ref 21–32)
CREAT SERPL-MCNC: 1 MG/DL (ref 0.6–1.3)
EST. AVERAGE GLUCOSE BLD GHB EST-MCNC: 157 MG/DL
GFR SERPL CREATININE-BSD FRML MDRD: 83 ML/MIN/1.73SQ M
GLUCOSE P FAST SERPL-MCNC: 121 MG/DL (ref 65–99)
HBA1C MFR BLD: 7.1 %
HDLC SERPL-MCNC: 50 MG/DL
LDLC SERPL CALC-MCNC: 103 MG/DL (ref 0–100)
NONHDLC SERPL-MCNC: 135 MG/DL
POTASSIUM SERPL-SCNC: 4.7 MMOL/L (ref 3.5–5.3)
PROT SERPL-MCNC: 7.5 G/DL (ref 6.4–8.2)
PSA SERPL-MCNC: 0.8 NG/ML (ref 0–4)
SODIUM SERPL-SCNC: 136 MMOL/L (ref 136–145)
TRIGL SERPL-MCNC: 159 MG/DL
TSH SERPL DL<=0.05 MIU/L-ACNC: 1.52 UIU/ML (ref 0.36–3.74)

## 2022-01-28 PROCEDURE — G0103 PSA SCREENING: HCPCS

## 2022-01-28 PROCEDURE — 84681 ASSAY OF C-PEPTIDE: CPT

## 2022-01-28 PROCEDURE — 82248 BILIRUBIN DIRECT: CPT

## 2022-01-28 PROCEDURE — 84443 ASSAY THYROID STIM HORMONE: CPT

## 2022-01-28 PROCEDURE — 36415 COLL VENOUS BLD VENIPUNCTURE: CPT

## 2022-01-28 PROCEDURE — 80061 LIPID PANEL: CPT

## 2022-01-28 PROCEDURE — 84402 ASSAY OF FREE TESTOSTERONE: CPT

## 2022-01-28 PROCEDURE — 80053 COMPREHEN METABOLIC PANEL: CPT

## 2022-01-28 PROCEDURE — 84403 ASSAY OF TOTAL TESTOSTERONE: CPT

## 2022-01-28 PROCEDURE — 83036 HEMOGLOBIN GLYCOSYLATED A1C: CPT

## 2022-01-29 LAB — C PEPTIDE SERPL-MCNC: 7.5 NG/ML (ref 1.1–4.4)

## 2022-01-30 LAB
TESTOST FREE SERPL-MCNC: 12.5 PG/ML (ref 7.2–24)
TESTOST SERPL-MCNC: 319 NG/DL (ref 264–916)

## 2022-02-15 ENCOUNTER — CLINICAL SUPPORT (OUTPATIENT)
Dept: NUTRITION | Facility: HOSPITAL | Age: 57
End: 2022-02-15
Payer: COMMERCIAL

## 2022-02-15 DIAGNOSIS — E11.9 DIABETES MELLITUS WITHOUT COMPLICATION (HCC): ICD-10-CM

## 2022-02-15 PROCEDURE — 97802 MEDICAL NUTRITION INDIV IN: CPT

## 2022-02-15 NOTE — PROGRESS NOTES
Initial Nutrition Assessment Form    Patient Name: Blanca Rosen    YOB: 1965    Sex: Male     Assessment Date: 2/15/2022  Start Time: 1300 Stop Time: 1400 Total Minutes: 60     Data:  Present at session: self   Parent/Patient Concerns: "My Doctor sent me, working on my weight, my blood sugars"   Medical Dx/Reason for Referral:   E11 9 Type 2 Diabetes Mellitus without complication       No past medical history on file  No current outpatient medications on file  No current facility-administered medications for this visit  Additional Meds/Supplements: None   Special Learning Needs: None   Height: HC Readings from Last 5 Encounters:   No data found for [facility]    6'2" per Patient report   Weight: Wt Readings from Last 10 Encounters:   No data found for Wt   323 lb per Patient report  There is no height or weight on file to calculate BMI  Recent Weight Change: []Yes     [x]No  Amount:       Energy Needs: [address] [facility] [address] [facility] Equation:  [id number] calories to maintain weight (2600 calorie diet recommended to support desirable weight loss and more healthful weight)   Not on File    Social History     Substance and Sexual Activity   Alcohol Use Not on file       Social History     Tobacco Use   Smoking Status Not on file   Smokeless Tobacco Not on file       Who shops? patient   Who cooks? patient   Exercise:    Prior Counseling?  []Yes     [x]No  When:      Why:         Diet Hx:  Breakfast: Day 1: Bagel, cream cheese, 20 oz coffee with nonfat, sugar-free Hazelnut creamer  Day 2: 3 eggs, 20 oz coffee with nonfat, sugar-free creamer  Day 3: Fat-free muffin, 40 oz coffee with nonfat milk, sugar-free creamer   Lunch: Day 1: Bran Muffin, 20 oz coffee with nonfat, sugar-free creamer  Day 2: 2 slices pizza, 20 oz unsweet iced tea  Day 3: 1 chicken cutlet   Dinner: Day 1: Shrimp pan-kim  Day 2: did not record  Day 3: Prime Rib, Potatoes, Rice, 20 oz unsweet ice tea    Drinks 20 oz water x 3 daily (60 oz/day); Drinks 20 oz unsweet ice tea x 3 daily (60 oz/day)        Nutrition Diagnosis:   Food and nutrition related knowledge deficit related to Lack of prior exposure to accurate nutrition related information as evidenced by Kevin inaccurate or incomplete information       Medical Nutrition Therapy Intervention:  [x]Individualized Meal Plan [x]Understanding Lab Values   []Basic Pathophysiology of Disease []Food/Medication Interactions   [x]Food Diary [x]Exercise   [x]Lifestyle/Behavior Modification Techniques []Medication, Mechanism of Action   [x]Label Reading [x]Self Blood Glucose Monitoring   [x]Weight/BMI Goals []Other -    Other Notes/Assessment: Patient's overall intakes within desired calorie goals per recorded 3 day Food Record, in fact not eating very much most days, however, overall quality of diet very limited and not well balanced  Reviewed MyPlate plan for 9486 calories, including portion sizes for each Food Group, portions for Carbohydrates and more healthful Carbohydrate choices, importance of balanced healthful meals to help with Blood Glucose control, weight control, and nutritional needs  Patient verbalized good understanding and wishes to make Follow-Up Visit in April-May 2022  Comprehension: []Excellent  []Very Good  [x]Good  []Fair   []Poor    Receptivity: []Excellent  [x]Very Good  []Good  []Fair   []Poor    Expected Compliance: []Excellent  [x]Very Good  []Good  []Fair   []Poor        Goals:  1  Increase fresh fruit to 2 servings/day, and pair with lean protein to help stabilize BG levels  2  Increase salads, fresh vegetables to at least 2 servings/day  Enjoys Caesar Salads, likes spinach, mushrooms, tomatoes  3  Limit meats/chicken/fish/eggs to 6-7 oz/day  No follow-ups on file    Labs:  CMP  Lab Results   Component Value Date    K 4 7 01/28/2022     01/28/2022    CO2 28 01/28/2022    BUN 25 01/28/2022    CREATININE 1 00 01/28/2022    GLUF 121 (H) 01/28/2022 CALCIUM 9 1 01/28/2022    AST 16 01/28/2022    ALT 32 01/28/2022    ALKPHOS 62 01/28/2022    EGFR 83 01/28/2022       BMP  Lab Results   Component Value Date    CALCIUM 9 1 01/28/2022    K 4 7 01/28/2022    CO2 28 01/28/2022     01/28/2022    BUN 25 01/28/2022    CREATININE 1 00 01/28/2022       Lipids  No results found for: CHOL  Lab Results   Component Value Date    HDL 50 01/28/2022    HDL 54 11/21/2020    HDL 47 08/30/2019     Lab Results   Component Value Date    LDLCALC 103 (H) 01/28/2022    LDLCALC 100 11/21/2020    LDLCALC 83 08/30/2019     Lab Results   Component Value Date    TRIG 159 (H) 01/28/2022    TRIG 182 (H) 11/21/2020    TRIG 168 (H) 08/30/2019     No results found for: CHOLHDL    Hemoglobin A1C  Lab Results   Component Value Date    HGBA1C 7 1 (H) 01/28/2022       Fasting Glucose  Lab Results   Component Value Date    GLUF 121 (H) 01/28/2022       Insulin     Thyroid  No results found for: TSH, N3AMNKR, C7HEQNN, THYROIDAB    Hepatic Function Panel  Lab Results   Component Value Date    ALT 32 01/28/2022    AST 16 01/28/2022    ALKPHOS 62 01/28/2022       Celiac Disease Antibody Panel  No results found for: ENDOMYSIAL IGA, GLIADIN IGA, GLIADIN IGG, IGA, TISSUE TRANSGLUT AB, TTG IGA   Iron  Lab Results   Component Value Date    IRON 63 (L) 05/18/2019    TIBC 339 05/18/2019    FERRITIN 88 05/18/2019            Luis Downing MS, RD, LDN  [address]  [address] [facility] [address]

## 2022-03-29 ENCOUNTER — APPOINTMENT (OUTPATIENT)
Dept: LAB | Facility: HOSPITAL | Age: 57
End: 2022-03-29
Payer: COMMERCIAL

## 2022-03-29 DIAGNOSIS — E78.5 HYPERLIPIDEMIA, UNSPECIFIED HYPERLIPIDEMIA TYPE: ICD-10-CM

## 2022-03-29 DIAGNOSIS — E55.9 VITAMIN D DEFICIENCY DISEASE: ICD-10-CM

## 2022-03-29 DIAGNOSIS — E11.65 UNCONTROLLED TYPE 2 DIABETES MELLITUS WITH HYPERGLYCEMIA (HCC): ICD-10-CM

## 2022-03-29 LAB
25(OH)D3 SERPL-MCNC: 37 NG/ML (ref 30–100)
ALT SERPL W P-5'-P-CCNC: 34 U/L (ref 12–78)
AST SERPL W P-5'-P-CCNC: 18 U/L (ref 5–45)
CHOLEST SERPL-MCNC: 131 MG/DL
CK SERPL-CCNC: 60 U/L (ref 39–308)
EST. AVERAGE GLUCOSE BLD GHB EST-MCNC: 140 MG/DL
HBA1C MFR BLD: 6.5 %
HDLC SERPL-MCNC: 53 MG/DL
LDLC SERPL CALC-MCNC: 44 MG/DL (ref 0–100)
NONHDLC SERPL-MCNC: 78 MG/DL
TRIGL SERPL-MCNC: 171 MG/DL

## 2022-03-29 PROCEDURE — 84450 TRANSFERASE (AST) (SGOT): CPT

## 2022-03-29 PROCEDURE — 82550 ASSAY OF CK (CPK): CPT

## 2022-03-29 PROCEDURE — 36415 COLL VENOUS BLD VENIPUNCTURE: CPT

## 2022-03-29 PROCEDURE — 80061 LIPID PANEL: CPT

## 2022-03-29 PROCEDURE — 83036 HEMOGLOBIN GLYCOSYLATED A1C: CPT

## 2022-03-29 PROCEDURE — 82306 VITAMIN D 25 HYDROXY: CPT

## 2022-03-29 PROCEDURE — 84460 ALANINE AMINO (ALT) (SGPT): CPT

## 2022-08-19 ENCOUNTER — APPOINTMENT (OUTPATIENT)
Dept: LAB | Facility: HOSPITAL | Age: 57
End: 2022-08-19
Payer: COMMERCIAL

## 2022-08-19 DIAGNOSIS — E11.65 TYPE II DIABETES MELLITUS WITH HYPEROSMOLARITY, UNCONTROLLED (HCC): ICD-10-CM

## 2022-08-19 DIAGNOSIS — E11.00 TYPE II DIABETES MELLITUS WITH HYPEROSMOLARITY, UNCONTROLLED (HCC): ICD-10-CM

## 2022-08-19 DIAGNOSIS — I10 ESSENTIAL HYPERTENSION, MALIGNANT: ICD-10-CM

## 2022-08-19 DIAGNOSIS — E04.1 NONTOXIC UNINODULAR GOITER: ICD-10-CM

## 2022-08-19 LAB
ALBUMIN SERPL BCP-MCNC: 4 G/DL (ref 3.5–5)
ALP SERPL-CCNC: 67 U/L (ref 46–116)
ALT SERPL W P-5'-P-CCNC: 38 U/L (ref 12–78)
ANION GAP SERPL CALCULATED.3IONS-SCNC: 11 MMOL/L (ref 4–13)
AST SERPL W P-5'-P-CCNC: 24 U/L (ref 5–45)
BILIRUB SERPL-MCNC: 0.42 MG/DL (ref 0.2–1)
BUN SERPL-MCNC: 25 MG/DL (ref 5–25)
CALCIUM SERPL-MCNC: 9.8 MG/DL (ref 8.3–10.1)
CHLORIDE SERPL-SCNC: 101 MMOL/L (ref 96–108)
CO2 SERPL-SCNC: 24 MMOL/L (ref 21–32)
CREAT SERPL-MCNC: 0.98 MG/DL (ref 0.6–1.3)
EST. AVERAGE GLUCOSE BLD GHB EST-MCNC: 146 MG/DL
GFR SERPL CREATININE-BSD FRML MDRD: 85 ML/MIN/1.73SQ M
GLUCOSE P FAST SERPL-MCNC: 127 MG/DL (ref 65–99)
HBA1C MFR BLD: 6.7 %
POTASSIUM SERPL-SCNC: 4.8 MMOL/L (ref 3.5–5.3)
PROT SERPL-MCNC: 8 G/DL (ref 6.4–8.4)
SODIUM SERPL-SCNC: 136 MMOL/L (ref 135–147)
TSH SERPL DL<=0.05 MIU/L-ACNC: 2.75 UIU/ML (ref 0.45–4.5)

## 2022-08-19 PROCEDURE — 36415 COLL VENOUS BLD VENIPUNCTURE: CPT

## 2022-08-19 PROCEDURE — 80053 COMPREHEN METABOLIC PANEL: CPT

## 2022-08-19 PROCEDURE — 84443 ASSAY THYROID STIM HORMONE: CPT

## 2022-08-19 PROCEDURE — 83036 HEMOGLOBIN GLYCOSYLATED A1C: CPT

## 2022-08-25 RX ORDER — PEN NEEDLE, DIABETIC 32GX 5/32"
NEEDLE, DISPOSABLE MISCELLANEOUS
COMMUNITY
Start: 2022-07-03 | End: 2022-08-29 | Stop reason: SDUPTHER

## 2022-08-25 RX ORDER — DULAGLUTIDE 3 MG/.5ML
3 INJECTION, SOLUTION SUBCUTANEOUS WEEKLY
COMMUNITY
Start: 2022-07-12 | End: 2022-12-21 | Stop reason: SDUPTHER

## 2022-08-25 RX ORDER — LISINOPRIL 30 MG/1
30 TABLET ORAL 2 TIMES DAILY
COMMUNITY
Start: 2022-07-03 | End: 2022-12-21 | Stop reason: SDUPTHER

## 2022-08-25 RX ORDER — PREDNISOLONE ACETATE 10 MG/ML
SUSPENSION/ DROPS OPHTHALMIC
COMMUNITY
Start: 2022-08-14 | End: 2022-12-21

## 2022-08-25 RX ORDER — INSULIN GLARGINE 300 U/ML
18 INJECTION, SOLUTION SUBCUTANEOUS DAILY
COMMUNITY
Start: 2022-07-03 | End: 2022-08-29 | Stop reason: SDUPTHER

## 2022-08-25 RX ORDER — CELECOXIB 200 MG/1
CAPSULE ORAL
COMMUNITY
Start: 2022-07-03

## 2022-08-25 RX ORDER — PROCHLORPERAZINE 25 MG/1
SUPPOSITORY RECTAL
COMMUNITY
Start: 2022-05-19 | End: 2022-12-16 | Stop reason: SDUPTHER

## 2022-08-25 RX ORDER — TADALAFIL 20 MG/1
TABLET ORAL
COMMUNITY
Start: 2022-08-11 | End: 2022-12-21

## 2022-08-25 RX ORDER — PAROXETINE HYDROCHLORIDE 20 MG/1
20 TABLET, FILM COATED ORAL DAILY
COMMUNITY
Start: 2022-07-24

## 2022-08-25 RX ORDER — AMOXICILLIN 875 MG/1
875 TABLET, COATED ORAL 2 TIMES DAILY
COMMUNITY
Start: 2022-06-08

## 2022-08-25 RX ORDER — ASPIRIN 325 MG
81 TABLET ORAL DAILY
COMMUNITY

## 2022-08-25 RX ORDER — FLUTICASONE PROPIONATE 50 MCG
SPRAY, SUSPENSION (ML) NASAL
COMMUNITY

## 2022-08-25 RX ORDER — LORAZEPAM 2 MG/1
2 TABLET ORAL 2 TIMES DAILY
COMMUNITY
Start: 2022-07-25

## 2022-08-25 RX ORDER — PROCHLORPERAZINE 25 MG/1
SUPPOSITORY RECTAL
COMMUNITY
Start: 2022-08-19 | End: 2022-12-16 | Stop reason: SDUPTHER

## 2022-08-25 RX ORDER — GLIPIZIDE 5 MG/1
TABLET ORAL
COMMUNITY
Start: 2022-08-03 | End: 2022-08-29

## 2022-08-25 RX ORDER — ROSUVASTATIN CALCIUM 10 MG/1
TABLET, COATED ORAL
COMMUNITY
Start: 2022-07-24 | End: 2022-08-29 | Stop reason: SDUPTHER

## 2022-08-29 ENCOUNTER — OFFICE VISIT (OUTPATIENT)
Dept: ENDOCRINOLOGY | Age: 57
End: 2022-08-29
Payer: COMMERCIAL

## 2022-08-29 VITALS
TEMPERATURE: 98 F | WEIGHT: 315 LBS | DIASTOLIC BLOOD PRESSURE: 72 MMHG | HEART RATE: 92 BPM | SYSTOLIC BLOOD PRESSURE: 130 MMHG | OXYGEN SATURATION: 96 %

## 2022-08-29 DIAGNOSIS — E11.65 INADEQUATELY CONTROLLED DIABETES MELLITUS (HCC): ICD-10-CM

## 2022-08-29 DIAGNOSIS — I10 HYPERTENSION, UNSPECIFIED TYPE: ICD-10-CM

## 2022-08-29 DIAGNOSIS — E11.65 INADEQUATELY CONTROLLED DIABETES MELLITUS (HCC): Primary | ICD-10-CM

## 2022-08-29 DIAGNOSIS — E78.2 MIXED HYPERLIPIDEMIA: ICD-10-CM

## 2022-08-29 PROCEDURE — 99214 OFFICE O/P EST MOD 30 MIN: CPT | Performed by: INTERNAL MEDICINE

## 2022-08-29 RX ORDER — ROSUVASTATIN CALCIUM 10 MG/1
10 TABLET, COATED ORAL DAILY
Qty: 90 TABLET | Refills: 4 | Status: SHIPPED | OUTPATIENT
Start: 2022-08-29

## 2022-08-29 RX ORDER — ALBUTEROL SULFATE 90 UG/1
2 AEROSOL, METERED RESPIRATORY (INHALATION) EVERY 6 HOURS PRN
COMMUNITY
Start: 2022-08-26 | End: 2022-08-29

## 2022-08-29 RX ORDER — PEN NEEDLE, DIABETIC 32GX 5/32"
NEEDLE, DISPOSABLE MISCELLANEOUS DAILY
Qty: 90 EACH | Refills: 3 | Status: SHIPPED | OUTPATIENT
Start: 2022-08-29

## 2022-08-29 RX ORDER — BENZONATATE 200 MG/1
200 CAPSULE ORAL 3 TIMES DAILY PRN
COMMUNITY
Start: 2022-08-26 | End: 2022-09-02

## 2022-08-29 RX ORDER — ALBUTEROL SULFATE 90 UG/1
AEROSOL, METERED RESPIRATORY (INHALATION)
COMMUNITY
Start: 2022-08-26

## 2022-08-29 RX ORDER — INSULIN GLARGINE 300 U/ML
30 INJECTION, SOLUTION SUBCUTANEOUS DAILY
Qty: 4.5 ML | Refills: 3 | Status: SHIPPED | OUTPATIENT
Start: 2022-08-29 | End: 2022-08-30

## 2022-08-29 RX ORDER — AZITHROMYCIN 250 MG/1
TABLET, FILM COATED ORAL
COMMUNITY
Start: 2022-08-26 | End: 2022-08-29

## 2022-08-29 RX ORDER — CETIRIZINE HYDROCHLORIDE 10 MG/1
TABLET ORAL
COMMUNITY
Start: 2022-08-26 | End: 2022-08-29

## 2022-08-29 RX ORDER — GLIPIZIDE 5 MG/1
7.5 TABLET ORAL DAILY
COMMUNITY
Start: 2022-05-23

## 2022-08-29 RX ORDER — BENZONATATE 200 MG/1
CAPSULE ORAL
COMMUNITY
Start: 2022-08-26 | End: 2022-08-29

## 2022-08-29 RX ORDER — CETIRIZINE HYDROCHLORIDE 10 MG/1
10 TABLET ORAL DAILY
COMMUNITY
Start: 2022-08-26 | End: 2022-09-09

## 2022-08-29 RX ORDER — INSULIN GLARGINE 300 U/ML
30 INJECTION, SOLUTION SUBCUTANEOUS DAILY
Qty: 4.5 ML | Refills: 6 | Status: SHIPPED | OUTPATIENT
Start: 2022-08-29 | End: 2022-08-29 | Stop reason: SDUPTHER

## 2022-08-29 RX ORDER — ROSUVASTATIN CALCIUM 10 MG/1
10 TABLET, COATED ORAL DAILY
Qty: 90 TABLET | Refills: 4 | Status: SHIPPED | OUTPATIENT
Start: 2022-08-29 | End: 2022-08-29 | Stop reason: SDUPTHER

## 2022-08-29 RX ORDER — PEN NEEDLE, DIABETIC 32GX 5/32"
NEEDLE, DISPOSABLE MISCELLANEOUS DAILY
Qty: 90 EACH | Refills: 3 | Status: SHIPPED | OUTPATIENT
Start: 2022-08-29 | End: 2022-08-29 | Stop reason: SDUPTHER

## 2022-08-29 RX ORDER — TADALAFIL 5 MG/1
TABLET ORAL
COMMUNITY
Start: 2022-08-20

## 2022-08-29 NOTE — PROGRESS NOTES
[facility] 64 y o  male MRN: [id number]    Encounter: [id number]      Assessment/Plan     Assessment: This is a 64y o -year-old male with diabetes with hyperglycemia  1-type 2 DM well controlled on multiple medications  His Dexcom CGM data review indicates 90% of the time his sugar is in goal range  But in the past couple weeks his fasting blood sugar has increased to 160-180  He mentions no signs and symptoms of nocturnal hypoglycemia  2-dyslipidemia, his last panel is from 5 months ago and that showed increase triglyceride but within range LDL  3-hypertension well controlled on 60 mg lisinopril daily  4-proteinuria his last urine test indicates it has dropped from 120 mg to 33 mg, improved  Plan:  He will continue all medications same except increasing bedtime insulin from 18 units to 28 to bring his recently increased fasting blood sugar to range of 100-120  CC: Diabetes    History of Present Illness     HPI:  80-year-old man with 18 years history of type 2 diabetes  Denies having history of pancreatitis  He has been taking Trulicity 3 mg per week in the past couple years  He is updated on his eye examination with no sign of diabetic retinopathy  He is on Dexcom CGM  Review of Systems   Constitutional: Negative for unexpected weight change  HENT: Negative for tinnitus and trouble swallowing  Eyes: Negative for visual disturbance  Respiratory: Negative for chest tightness and shortness of breath  Cardiovascular: Negative for chest pain and palpitations  Gastrointestinal: Negative for blood in stool, constipation, diarrhea and nausea  Endocrine: Negative for polydipsia and polyuria  Genitourinary: Negative for genital sores and penile discharge  Musculoskeletal: Negative  Skin: Negative  Neurological: Negative  Hematological: Does not bruise/bleed easily  Psychiatric/Behavioral: Negative  He has lost his father recently and feels sad  Historical Information   Past Medical History:   Diagnosis Date    Anxiety     [facility] )     [facility]    Dyslipidemia     ED (erectile dysfunction)     Hypertension      Past Surgical History:   Procedure Laterality Date    HERNIA REPAIR      scotal, childhood     Social History   Social History     Substance and Sexual Activity   Alcohol Use None    Comment: occasional     Social History     Substance and Sexual Activity   Drug Use Not on file     Social History     Tobacco Use   Smoking Status Former Smoker   Smokeless Tobacco Not on file     Family History:   Family History   Problem Relation Age of Onset    Diabetes Mother     Liver disease Father     Diabetes Family        Meds/Allergies   Current Outpatient Medications   Medication Sig Dispense Refill    albuterol (PROVENTIL HFA,VENTOLIN HFA) 90 mcg/act inhaler INHALE 2 PUFFS EVERY 6 HOURS AS NEEDED FOR WHEEZING      aspirin 325 mg tablet Take 81 mg by mouth daily      BD Pen Needle Maria Eugenia 2nd Gen 32G X 4 MM MISC Inject under the skin in the morning 90 each 3    benzonatate (TESSALON) 200 MG capsule Take 200 mg by mouth Three times daily as needed      celecoxib (CeleBREX) 200 mg capsule       cetirizine (ZyrTEC) 10 mg tablet Take 10 mg by mouth daily      Continuous Blood Gluc Sensor (Dexcom G6 Sensor) MISC       Continuous Blood Gluc Transmit (Dexcom G6 Transmitter) MISC       fluticasone (FLONASE) 50 mcg/act nasal spray fluticasone propionate 50 mcg/actuation nasal spray,suspension      glipiZIDE (GLUCOTROL) 5 mg tablet Take 7 5 mg by mouth daily      lisinopril (ZESTRIL) 30 mg tablet Take 30 mg by mouth 2 (two) times a day      LORazepam (ATIVAN) 2 mg tablet Take 2 mg by mouth 2 (two) times a day      metFORMIN (GLUCOPHAGE) 1000 MG tablet Take 1,000 mg by mouth 2 (two) times a day      PARoxetine (PAXIL) 20 mg tablet Take 20 mg by mouth daily      rosuvastatin (CRESTOR) 10 MG tablet Take 1 tablet (10 mg total) by mouth daily 90 tablet 4    tadalafil (CIALIS) 5 MG tablet       Toujeo SoloStar 300 units/mL CONCENTRATED U-300 injection pen (1-unit dial) Inject 30 Units under the skin daily 4 5 mL 6    Trulicity 3 QD/5 5ZL SOPN Inject 3 mg under the skin once a week      amoxicillin (AMOXIL) 875 mg tablet Take 875 mg by mouth 2 (two) times a day (Patient not taking: Reported on 8/29/2022)      prednisoLONE acetate (PRED FORTE) 1 % ophthalmic suspension INSTILL 1 DROP INTO EACH EYE 4 TIMES DAILY (Patient not taking: Reported on 8/29/2022)      tadalafil (CIALIS) 20 MG tablet  (Patient not taking: Reported on 8/29/2022)       No current facility-administered medications for this visit  No Known Allergies    Objective   Vitals: Blood pressure 130/72, pulse 92, temperature 98 °F (36 7 °C), weight (!) 160 kg (352 lb 12 8 oz), SpO2 96 %  Physical Exam  Constitutional:       Appearance: Normal appearance  He is obese  HENT:      Head: Normocephalic  Mouth/Throat:      Mouth: Mucous membranes are moist    Eyes:      General: No scleral icterus  Extraocular Movements: Extraocular movements intact  Pupils: Pupils are equal, round, and reactive to light  Neck:      Thyroid: No thyroid mass or thyromegaly  Vascular: No carotid bruit  Cardiovascular:      Rate and Rhythm: Normal rate and regular rhythm  Pulses: Normal pulses  Dorsalis pedis pulses are 2+ on the right side and 2+ on the left side  Heart sounds: Normal heart sounds  No murmur heard  Pulmonary:      Breath sounds: No rhonchi or rales  Abdominal:      General: Bowel sounds are normal       Palpations: Abdomen is soft  There is no mass  Tenderness: There is no abdominal tenderness  Genitourinary:     Comments: Deferred  Musculoskeletal:      Right lower leg: Edema present  Left lower leg: Edema present  Right foot: No deformity, bunion or Charcot foot        Left foot: No deformity, bunion or Charcot foot  Feet:      Right foot:      Protective Sensation: 7 sites tested  7 sites sensed  Skin integrity: Skin integrity normal       Toenail Condition: Right toenails are normal       Left foot:      Protective Sensation: 7 sites tested  7 sites sensed  Skin integrity: Skin integrity normal       Toenail Condition: Left toenails are normal       Comments: Bilateral varicose veins  Lymphadenopathy:      Cervical: No cervical adenopathy  Skin:     General: Skin is warm  Coloration: Skin is not jaundiced  Findings: No rash  Neurological:      General: No focal deficit present  Mental Status: He is alert and oriented to person, place, and time  Cranial Nerves: No cranial nerve deficit  Psychiatric:         Behavior: Behavior normal          The history was obtained from the review of the chart, patient  Lab Results:   Lab Results   Component Value Date/Time    Hemoglobin A1C 6 7 (H) 08/19/2022 06:57 AM    Hemoglobin A1C 6 5 (H) 03/29/2022 06:57 AM    Hemoglobin A1C 7 1 (H) 01/28/2022 07:17 AM    BUN 25 08/19/2022 06:57 AM    BUN 25 01/28/2022 07:17 AM    Potassium 4 8 08/19/2022 06:57 AM    Potassium 4 7 01/28/2022 07:17 AM    Chloride 101 08/19/2022 06:57 AM    Chloride 100 01/28/2022 07:17 AM    CO2 24 08/19/2022 06:57 AM    CO2 28 01/28/2022 07:17 AM    Creatinine 0 98 08/19/2022 06:57 AM    Creatinine 1 00 01/28/2022 07:17 AM    AST 24 08/19/2022 06:57 AM    AST 18 03/29/2022 06:57 AM    AST 16 01/28/2022 07:17 AM    ALT 38 08/19/2022 06:57 AM    ALT 34 03/29/2022 06:57 AM    ALT 32 01/28/2022 07:17 AM    Albumin 4 0 08/19/2022 06:57 AM    Albumin 3 7 01/28/2022 07:17 AM    HDL, Direct 53 03/29/2022 06:57 AM    HDL, Direct 50 01/28/2022 07:17 AM    Triglycerides 171 (H) 03/29/2022 06:57 AM    Triglycerides 159 (H) 01/28/2022 07:17 AM           Imaging Studies: I have personally reviewed pertinent reports        Portions of the record may have been created with voice recognition software  Occasional wrong word or "sound a like" substitutions may have occurred due to the inherent limitations of voice recognition software  Read the chart carefully and recognize, using context, where substitutions have occurred

## 2022-08-30 DIAGNOSIS — E11.65 INADEQUATELY CONTROLLED DIABETES MELLITUS (HCC): ICD-10-CM

## 2022-08-30 RX ORDER — INSULIN GLARGINE 300 U/ML
30 INJECTION, SOLUTION SUBCUTANEOUS DAILY
Qty: 9 ML | Refills: 3 | Status: SHIPPED | OUTPATIENT
Start: 2022-08-30 | End: 2022-11-28

## 2022-08-30 RX ORDER — INSULIN GLARGINE 300 U/ML
30 INJECTION, SOLUTION SUBCUTANEOUS DAILY
Qty: 9 ML | Refills: 3 | Status: SHIPPED | OUTPATIENT
Start: 2022-08-30 | End: 2022-08-30

## 2022-12-12 ENCOUNTER — APPOINTMENT (OUTPATIENT)
Dept: LAB | Facility: HOSPITAL | Age: 57
End: 2022-12-12

## 2022-12-12 DIAGNOSIS — E78.2 MIXED HYPERLIPIDEMIA: ICD-10-CM

## 2022-12-12 DIAGNOSIS — E78.5 HYPERLIPIDEMIA, UNSPECIFIED HYPERLIPIDEMIA TYPE: ICD-10-CM

## 2022-12-12 DIAGNOSIS — I51.9 MYXEDEMA HEART DISEASE: ICD-10-CM

## 2022-12-12 DIAGNOSIS — E03.9 MYXEDEMA HEART DISEASE: ICD-10-CM

## 2022-12-12 DIAGNOSIS — Z12.5 SPECIAL SCREENING FOR MALIGNANT NEOPLASM OF PROSTATE: ICD-10-CM

## 2022-12-12 DIAGNOSIS — E13.9 DIABETES MELLITUS OF OTHER TYPE WITHOUT COMPLICATION, UNSPECIFIED WHETHER LONG TERM INSULIN USE (HCC): ICD-10-CM

## 2022-12-12 DIAGNOSIS — E11.65 INADEQUATELY CONTROLLED DIABETES MELLITUS (HCC): ICD-10-CM

## 2022-12-12 DIAGNOSIS — D50.9 IRON DEFICIENCY ANEMIA, UNSPECIFIED IRON DEFICIENCY ANEMIA TYPE: ICD-10-CM

## 2022-12-12 LAB
ALBUMIN SERPL BCP-MCNC: 3.6 G/DL (ref 3.5–5)
ALP SERPL-CCNC: 57 U/L (ref 46–116)
ALT SERPL W P-5'-P-CCNC: 31 U/L (ref 12–78)
ANION GAP SERPL CALCULATED.3IONS-SCNC: 5 MMOL/L (ref 4–13)
AST SERPL W P-5'-P-CCNC: 17 U/L (ref 5–45)
BILIRUB DIRECT SERPL-MCNC: 0.11 MG/DL (ref 0–0.2)
BILIRUB SERPL-MCNC: 0.42 MG/DL (ref 0.2–1)
BUN SERPL-MCNC: 21 MG/DL (ref 5–25)
CALCIUM SERPL-MCNC: 9.1 MG/DL (ref 8.3–10.1)
CHLORIDE SERPL-SCNC: 106 MMOL/L (ref 96–108)
CHOLEST SERPL-MCNC: 115 MG/DL
CO2 SERPL-SCNC: 25 MMOL/L (ref 21–32)
CREAT SERPL-MCNC: 0.92 MG/DL (ref 0.6–1.3)
ERYTHROCYTE [DISTWIDTH] IN BLOOD BY AUTOMATED COUNT: 13.4 % (ref 11.6–15.1)
EST. AVERAGE GLUCOSE BLD GHB EST-MCNC: 137 MG/DL
FERRITIN SERPL-MCNC: 97 NG/ML (ref 8–388)
GFR SERPL CREATININE-BSD FRML MDRD: 91 ML/MIN/1.73SQ M
GLUCOSE P FAST SERPL-MCNC: 146 MG/DL (ref 65–99)
HBA1C MFR BLD: 6.4 %
HCT VFR BLD AUTO: 40.5 % (ref 36.5–49.3)
HDLC SERPL-MCNC: 45 MG/DL
HGB BLD-MCNC: 13.5 G/DL (ref 12–17)
IRON SATN MFR SERPL: 19 % (ref 20–50)
IRON SERPL-MCNC: 64 UG/DL (ref 65–175)
LDLC SERPL CALC-MCNC: 39 MG/DL (ref 0–100)
MCH RBC QN AUTO: 30.3 PG (ref 26.8–34.3)
MCHC RBC AUTO-ENTMCNC: 33.3 G/DL (ref 31.4–37.4)
MCV RBC AUTO: 91 FL (ref 82–98)
PLATELET # BLD AUTO: 185 THOUSANDS/UL (ref 149–390)
PMV BLD AUTO: 10.2 FL (ref 8.9–12.7)
POTASSIUM SERPL-SCNC: 4.5 MMOL/L (ref 3.5–5.3)
PROT SERPL-MCNC: 7.3 G/DL (ref 6.4–8.4)
PSA SERPL-MCNC: 1.2 NG/ML (ref 0–4)
RBC # BLD AUTO: 4.46 MILLION/UL (ref 3.88–5.62)
SODIUM SERPL-SCNC: 136 MMOL/L (ref 135–147)
T4 FREE SERPL-MCNC: 0.92 NG/DL (ref 0.76–1.46)
TIBC SERPL-MCNC: 331 UG/DL (ref 250–450)
TRIGL SERPL-MCNC: 154 MG/DL
TSH SERPL DL<=0.05 MIU/L-ACNC: 1.71 UIU/ML (ref 0.45–4.5)
WBC # BLD AUTO: 6.82 THOUSAND/UL (ref 4.31–10.16)

## 2022-12-16 RX ORDER — BLOOD-GLUCOSE SENSOR
EACH MISCELLANEOUS
COMMUNITY
End: 2022-12-21 | Stop reason: SDUPTHER

## 2022-12-16 RX ORDER — BLOOD-GLUCOSE TRANSMITTER
EACH MISCELLANEOUS
COMMUNITY
End: 2022-12-21 | Stop reason: SDUPTHER

## 2022-12-21 ENCOUNTER — OFFICE VISIT (OUTPATIENT)
Dept: ENDOCRINOLOGY | Age: 57
End: 2022-12-21

## 2022-12-21 VITALS
DIASTOLIC BLOOD PRESSURE: 88 MMHG | BODY MASS INDEX: 40.43 KG/M2 | WEIGHT: 315 LBS | SYSTOLIC BLOOD PRESSURE: 132 MMHG | HEART RATE: 84 BPM | HEIGHT: 74 IN

## 2022-12-21 DIAGNOSIS — E78.2 MIXED HYPERLIPIDEMIA: ICD-10-CM

## 2022-12-21 DIAGNOSIS — E11.65 INADEQUATELY CONTROLLED DIABETES MELLITUS (HCC): Primary | ICD-10-CM

## 2022-12-21 DIAGNOSIS — I10 HYPERTENSION, UNSPECIFIED TYPE: ICD-10-CM

## 2022-12-21 RX ORDER — BLOOD-GLUCOSE TRANSMITTER
EACH MISCELLANEOUS
Qty: 1 EACH | Refills: 3 | Status: SHIPPED | OUTPATIENT
Start: 2022-12-21

## 2022-12-21 RX ORDER — GLIPIZIDE 5 MG/1
7.5 TABLET ORAL DAILY
Qty: 135 TABLET | Refills: 2 | Status: SHIPPED | OUTPATIENT
Start: 2022-12-21 | End: 2023-03-21

## 2022-12-21 RX ORDER — LISINOPRIL 30 MG/1
30 TABLET ORAL 2 TIMES DAILY
Qty: 180 TABLET | Refills: 2 | Status: SHIPPED | OUTPATIENT
Start: 2022-12-21 | End: 2023-03-21

## 2022-12-21 RX ORDER — DULAGLUTIDE 3 MG/.5ML
3 INJECTION, SOLUTION SUBCUTANEOUS WEEKLY
Qty: 6 ML | Refills: 3 | Status: SHIPPED | OUTPATIENT
Start: 2022-12-21 | End: 2023-03-21

## 2022-12-21 RX ORDER — BLOOD-GLUCOSE SENSOR
EACH MISCELLANEOUS
Qty: 9 EACH | Refills: 3 | Status: SHIPPED | OUTPATIENT
Start: 2022-12-21

## 2022-12-21 NOTE — PROGRESS NOTES
Cristela Hodgson 62 y o  male MRN: [id number]    Encounter: [id number]      Assessment/Plan     Assessment: This is a 62y o -year-old male who is here for follow up on  1-T2DM: well controlled with A1c of 6 4%  His CGM  Average ID=[id number]  Variation 29  1% hypoglycemia usually at lunch time  2-dyslipidemia: controlled  3-HTN: controlled with no proteinuria    Plan:  All meds same but   Glucotrol 2 5 mg q am 5 mg q pm  Eye exam report  Podiatrist visit  RTV in 4 months with A1c and UA    CC: Diabetes    History of Present Illness     HPI:  T2DM with DKA and pancreatitis  Dyslipidemia with noASVD events  HTN     Review of Systems   Constitutional: Negative for appetite change, fatigue and unexpected weight change  HENT: Negative for trouble swallowing  Eyes: Negative for visual disturbance  Respiratory: Negative for cough, chest tightness and shortness of breath  Cardiovascular: Negative for chest pain, palpitations and leg swelling  Gastrointestinal: Negative for abdominal pain, blood in stool, constipation, diarrhea, nausea and vomiting  Endocrine: Negative for polyphagia and polyuria  Genitourinary: Negative for dysuria, frequency and genital sores  Musculoskeletal: Negative for arthralgias  Skin: Negative for rash and wound  Neurological: Negative for weakness, numbness and headaches  Hematological: Does not bruise/bleed easily  Psychiatric/Behavioral: Negative for confusion         Historical Information   Past Medical History:   Diagnosis Date   • Anxiety    • Diabetes mellitus ([facility] )    • Diabetic eye exam ([facility] ) 09/2021   • Dyslipidemia    • ED (erectile dysfunction)    • Hypertension      Past Surgical History:   Procedure Laterality Date   • HERNIA REPAIR      scotal, childhood     Social History   Social History     Substance and Sexual Activity   Alcohol Use Not Currently    Comment: occasional     Social History     Substance and Sexual Activity   Drug Use Never     Social History     Tobacco Use   Smoking Status Former   Smokeless Tobacco Not on file     Family History:   Family History   Problem Relation Age of Onset   • Diabetes Mother    • Liver disease Father    • Diabetes Family        Meds/Allergies   Current Outpatient Medications   Medication Sig Dispense Refill   • albuterol (PROVENTIL HFA,VENTOLIN HFA) 90 mcg/act inhaler INHALE 2 PUFFS EVERY 6 HOURS AS NEEDED FOR WHEEZING     • aspirin 325 mg tablet Take 81 mg by mouth daily     • BD Pen Needle Maria Eugenia 2nd Gen 32G X 4 MM MISC Inject under the skin in the morning 90 each 3   • celecoxib (CeleBREX) 200 mg capsule      • Continuous Blood Gluc Sensor (Dexcom G6 Sensor) MISC One every 10 days 9 each 3   • Continuous Blood Gluc Transmit (Dexcom G6 Transmitter) MISC One every 3 months 1 each 3   • fluticasone (FLONASE) 50 mcg/act nasal spray fluticasone propionate 50 mcg/actuation nasal spray,suspension     • glipiZIDE (GLUCOTROL) 5 mg tablet Take 1 5 tablets (7 5 mg total) by mouth daily 135 tablet 2   • lisinopril (ZESTRIL) 30 mg tablet Take 1 tablet (30 mg total) by mouth 2 (two) times a day 180 tablet 2   • LORazepam (ATIVAN) 2 mg tablet Take 2 mg by mouth 2 (two) times a day     • metFORMIN (GLUCOPHAGE) 1000 MG tablet Take 1,000 mg by mouth 2 (two) times a day     • PARoxetine (PAXIL) 20 mg tablet Take 20 mg by mouth daily     • rosuvastatin (CRESTOR) 10 MG tablet Take 1 tablet (10 mg total) by mouth daily 90 tablet 4   • tadalafil (CIALIS) 5 MG tablet      • Toujeo SoloStar 300 units/mL CONCENTRATED U-300 injection pen (1-unit dial) Inject 30 Units under the skin daily 9 mL 3   • Trulicity 3 FB/6 8NM SOPN Inject 0 5 mL (3 mg total) under the skin once a week 6 mL 3   • amoxicillin (AMOXIL) 875 mg tablet Take 875 mg by mouth 2 (two) times a day (Patient not taking: Reported on 8/29/2022)     • cetirizine (ZyrTEC) 10 mg tablet Take 10 mg by mouth daily       No current facility-administered medications for this visit       No Known Allergies    Objective   Vitals: Blood pressure 132/88, pulse 84, height 6' 2" (1 88 m), weight (!) 163 kg (360 lb 3 2 oz)  Physical Exam  Vitals reviewed  Constitutional:       Appearance: He is obese  HENT:      Head: Normocephalic and atraumatic  Eyes:      Extraocular Movements: Extraocular movements intact  Neck:      Thyroid: No thyromegaly  Vascular: No carotid bruit  Cardiovascular:      Rate and Rhythm: Normal rate and regular rhythm  Pulses: Normal pulses  Heart sounds: Normal heart sounds  No murmur heard  No friction rub  No gallop  Pulmonary:      Effort: Pulmonary effort is normal       Breath sounds: Normal breath sounds  No stridor  No wheezing, rhonchi or rales  Abdominal:      General: Bowel sounds are normal       Palpations: Abdomen is soft  There is no mass  Musculoskeletal:         General: No swelling or deformity  Cervical back: No tenderness  Right lower leg: Edema present  Left lower leg: No edema  Lymphadenopathy:      Cervical: No cervical adenopathy  Skin:     General: Skin is warm  Coloration: Skin is not jaundiced  Findings: No rash  Neurological:      General: No focal deficit present  Mental Status: He is alert and oriented to person, place, and time  Psychiatric:         Mood and Affect: Mood normal          Behavior: Behavior normal          The history was obtained from the review of the chart, patient      Lab Results:   Lab Results   Component Value Date/Time    Hemoglobin A1C 6 4 (H) 12/12/2022 07:53 AM    Hemoglobin A1C 6 7 (H) 08/19/2022 06:57 AM    Hemoglobin A1C 6 5 (H) 03/29/2022 06:57 AM    WBC 6 82 12/12/2022 07:53 AM    Hemoglobin 13 5 12/12/2022 07:53 AM    Hematocrit 40 5 12/12/2022 07:53 AM    MCV 91 12/12/2022 07:53 AM    Platelets 771 17/53/5219 07:53 AM    BUN 21 12/12/2022 07:53 AM    BUN 25 08/19/2022 06:57 AM    BUN 25 01/28/2022 07:17 AM    Potassium 4 5 12/12/2022 07:53 AM Potassium 4 8 08/19/2022 06:57 AM    Potassium 4 7 01/28/2022 07:17 AM    Chloride 106 12/12/2022 07:53 AM    Chloride 101 08/19/2022 06:57 AM    Chloride 100 01/28/2022 07:17 AM    CO2 25 12/12/2022 07:53 AM    CO2 24 08/19/2022 06:57 AM    CO2 28 01/28/2022 07:17 AM    Creatinine 0 92 12/12/2022 07:53 AM    Creatinine 0 98 08/19/2022 06:57 AM    Creatinine 1 00 01/28/2022 07:17 AM    AST 17 12/12/2022 07:53 AM    AST 24 08/19/2022 06:57 AM    AST 18 03/29/2022 06:57 AM    ALT 31 12/12/2022 07:53 AM    ALT 38 08/19/2022 06:57 AM    ALT 34 03/29/2022 06:57 AM    Albumin 3 6 12/12/2022 07:53 AM    Albumin 4 0 08/19/2022 06:57 AM    Albumin 3 7 01/28/2022 07:17 AM    HDL, Direct 45 12/12/2022 07:53 AM    HDL, Direct 53 03/29/2022 06:57 AM    HDL, Direct 50 01/28/2022 07:17 AM    Triglycerides 154 (H) 12/12/2022 07:53 AM    Triglycerides 171 (H) 03/29/2022 06:57 AM    Triglycerides 159 (H) 01/28/2022 07:17 AM           Imaging Studies: I have personally reviewed pertinent reports  Portions of the record may have been created with voice recognition software  Occasional wrong word or "sound a like" substitutions may have occurred due to the inherent limitations of voice recognition software  Read the chart carefully and recognize, using context, where substitutions have occurred

## 2022-12-29 ENCOUNTER — OFFICE VISIT (OUTPATIENT)
Dept: PODIATRY | Facility: CLINIC | Age: 57
End: 2022-12-29

## 2022-12-29 VITALS
SYSTOLIC BLOOD PRESSURE: 130 MMHG | WEIGHT: 315 LBS | BODY MASS INDEX: 40.43 KG/M2 | DIASTOLIC BLOOD PRESSURE: 85 MMHG | HEIGHT: 74 IN | HEART RATE: 86 BPM

## 2022-12-29 DIAGNOSIS — E11.42 DIABETIC POLYNEUROPATHY ASSOCIATED WITH TYPE 2 DIABETES MELLITUS (HCC): Primary | Chronic | ICD-10-CM

## 2022-12-29 DIAGNOSIS — R60.0 LOWER EXTREMITY EDEMA: Chronic | ICD-10-CM

## 2022-12-29 NOTE — PROGRESS NOTES
Assessment/Plan:         Diagnoses and all orders for this visit:    Diabetic polyneuropathy associated with type 2 diabetes mellitus ([facility] )  Comments:  mild    Lower extremity edema          Diagnosis and options discussed with patient  Patient agreeable to the plan as stated below    -Educated on DM risk to lower extremities, proper shoe gear, and daily monitoring of feet    -Educated on A1C and the risks of poorly controlled Diabetes  Reviewed recent A1C, 6 4  -Discussed weight loss and suitable exercise regiment  Reviewed most recent endocrine visit on 12/21/2022  -Mild diminished pinprick and vibratory sensation  OVerall gross sensation intact  DM foot risk low  Annual exams unless new concerns arise    PAtients biggest risk to his feet currently is his weight  HE gets SOB just putting shoes on and walking down the [place]  Recommended he discuss the SOB with his PCP  Subjective:      Patient ID: Surendra George is a 62 y o  male  Patient was referred by his endocrinologist  He as DM2  He's been getting foot swelling and possible neuropathy  HE has been going to a podiatrist for year in [place] but recently moved here  HE gets some thick toenails and callus  His A1C is 6 4  The swelling gets worse throughout the day  He started wearing comprfession socks  The following portions of the patient's history were reviewed and updated as appropriate: allergies, current medications, past family history, past medical history, past social history, past surgical history and problem list     Review of Systems    Constitutional: Negative  HENT: Negative for sinus pressure and sinus pain  Respiratory: Negative for cough and shortness of breath  Cardiovascular: Negative for chest pain and leg swelling  Gastrointestinal: Negative for diarrhea, nausea and vomiting  Musculoskeletal: swelling ankles and feet  Skin: Negative for rash or wound  Neurological: Negative for weakness, numbness and headaches  Psychiatric/Behavioral: The patient is not nervous/anxious  Objective:      /85   Pulse 86   Ht 6' 2" (1 88 m)   Wt (!) 163 kg (360 lb)   BMI 46 22 kg/m²     HEMOGLOBIN A1C W/ EAG ESTIMATION Lab Collect  Order: [id number]   Status: Final result      Visible to patient: Yes (seen)      Next appt: 04/24/2023 at 08:00 AM in Endocrinology Miracle Tariq MD)      Dx: Inadequately controlled diabetes ferny         0 Result Notes     1  Topic  Component Ref Range & Units 12/12/22  7:53 AM 8/19/22  6:57 AM 3/29/22  6:57 AM 1/28/22  7:17 AM 11/21/20  8:42 AM 8/30/19  7:55 AM 5/18/19  8:22 AM   Hemoglobin A1C Normal 3 8-5 6%; PreDiabetic 5 7-6 4%; Diabetic >=6 5%; Glycemic control for adults with diabetes <7 0% % 6 4 High   6 7 High   6 5 High                   Physical Exam  Vitals reviewed  Constitutional:       Appearance: He is obese  He is not ill-appearing or diaphoretic  Cardiovascular:      Rate and Rhythm: Normal rate  Pulses: Normal pulses  no weak pulses          Dorsalis pedis pulses are 2+ on the right side and 2+ on the left side  Posterior tibial pulses are 2+ on the right side and 2+ on the left side  Pulmonary:      Effort: Pulmonary effort is normal  No respiratory distress  Musculoskeletal:      Right lower leg: Edema present  Left lower leg: Edema present  Right foot: Normal range of motion  No deformity, bunion, Charcot foot, foot drop or prominent metatarsal heads  Left foot: Normal range of motion  No deformity, bunion, Charcot foot, foot drop or prominent metatarsal heads  Feet:      Right foot:      Protective Sensation: 10 sites tested  9 sites sensed  Skin integrity: No ulcer, callus or dry skin  Toenail Condition: Right toenails are normal       Left foot:      Protective Sensation: 10 sites tested  10 sites sensed  Skin integrity: No ulcer, callus or dry skin        Toenail Condition: Left toenails are normal    Skin: Capillary Refill: Capillary refill takes less than 2 seconds  Findings: No erythema or rash  Neurological:      Mental Status: He is alert and oriented to person, place, and time  Sensory: Sensory deficit present  Motor: No weakness  Gait: Gait normal            Diabetic Foot Exam    Patient's shoes and socks removed  Right Foot/Ankle   Right Foot Inspection  Skin Exam: skin intact  No dry skin, no callus, no abnormal color, no ulcer and no callus  Toe Exam: ROM and strength within normal limits and swelling  No tenderness, erythema and  no right toe deformity    Sensory   Vibration: diminished  Proprioception: intact  Monofilament testing: intact    Vascular  Capillary refills: < 3 seconds  The right DP pulse is 2+  The right PT pulse is 2+  Right Toe  - Comprehensive Exam  Arch: normal  Hallux valgus: no  Swelling: dorsum   Tenderness: none         Left Foot/Ankle  Left Foot Inspection  Skin Exam: skin intact  No dry skin, normal color, no ulcer and no callus  Toe Exam: ROM and strength within normal limits and swelling  No tenderness, no erythema and no left toe deformity  Sensory   Vibration: diminished  Proprioception: intact  Monofilament testing: intact    Vascular  Capillary refills: < 3 seconds  The left DP pulse is 2+  The left PT pulse is 2+       Left Toe  - Comprehensive Exam  Arch: normal  Hallux valgus: no  Swelling: dorsum   Tenderness: none           Assign Risk Category  No deformity present  No loss of protective sensation  No weak pulses  Risk: 0

## 2023-03-08 DIAGNOSIS — E11.65 INADEQUATELY CONTROLLED DIABETES MELLITUS (HCC): ICD-10-CM

## 2023-03-08 RX ORDER — GLIPIZIDE 5 MG/1
TABLET ORAL
Qty: 135 TABLET | Refills: 3 | Status: SHIPPED | OUTPATIENT
Start: 2023-03-08

## 2023-03-23 DIAGNOSIS — E11.65 INADEQUATELY CONTROLLED DIABETES MELLITUS (HCC): ICD-10-CM

## 2023-03-23 RX ORDER — PEN NEEDLE, DIABETIC 32GX 5/32"
NEEDLE, DISPOSABLE MISCELLANEOUS
Qty: 100 EACH | Refills: 3 | Status: SHIPPED | OUTPATIENT
Start: 2023-03-23

## 2023-04-20 RX ORDER — TADALAFIL 20 MG/1
TABLET ORAL
COMMUNITY
Start: 2023-01-14

## 2023-04-24 ENCOUNTER — TELEPHONE (OUTPATIENT)
Dept: NEPHROLOGY | Facility: CLINIC | Age: 58
End: 2023-04-24

## 2023-04-24 ENCOUNTER — OFFICE VISIT (OUTPATIENT)
Age: 58
End: 2023-04-24

## 2023-04-24 VITALS
OXYGEN SATURATION: 98 % | SYSTOLIC BLOOD PRESSURE: 148 MMHG | TEMPERATURE: 98 F | BODY MASS INDEX: 40.43 KG/M2 | WEIGHT: 315 LBS | DIASTOLIC BLOOD PRESSURE: 84 MMHG | HEART RATE: 77 BPM | HEIGHT: 74 IN

## 2023-04-24 DIAGNOSIS — E11.42 DIABETIC POLYNEUROPATHY ASSOCIATED WITH TYPE 2 DIABETES MELLITUS (HCC): Primary | Chronic | ICD-10-CM

## 2023-04-24 DIAGNOSIS — I10 HYPERTENSION, UNSPECIFIED TYPE: ICD-10-CM

## 2023-04-24 DIAGNOSIS — E78.2 MIXED HYPERLIPIDEMIA: ICD-10-CM

## 2023-04-24 DIAGNOSIS — E11.65 INADEQUATELY CONTROLLED DIABETES MELLITUS (HCC): ICD-10-CM

## 2023-04-24 RX ORDER — ROSUVASTATIN CALCIUM 10 MG/1
10 TABLET, COATED ORAL DAILY
Qty: 90 TABLET | Refills: 4 | Status: SHIPPED | OUTPATIENT
Start: 2023-04-24

## 2023-04-24 RX ORDER — INSULIN GLARGINE 300 U/ML
30 INJECTION, SOLUTION SUBCUTANEOUS DAILY
Qty: 9 ML | Refills: 3 | Status: SHIPPED | OUTPATIENT
Start: 2023-04-24 | End: 2023-07-23

## 2023-04-24 RX ORDER — LISINOPRIL 30 MG/1
30 TABLET ORAL 2 TIMES DAILY
Qty: 180 TABLET | Refills: 3 | Status: SHIPPED | OUTPATIENT
Start: 2023-04-24 | End: 2023-07-23

## 2023-04-24 NOTE — PROGRESS NOTES
Jon Radford 62 y o  male MRN: [id number]    Encounter: [id number]      Assessment/Plan     Assessment: This is a 62y o -year-old male follow up on    1-T2DM: well controlled with A1c of 6 4%  Patient is on metformin Trulicity Toujeo and glipizide  He is on CGM and the past 30 days he is 85% in range with the blood sugar average of 150 and the variation of 51  He is due for his eye exam   He is due for his foot exam also by his podiatrist   He has proteinuria  He was not able to tolerate Jardiance in the past but I prefer him to be seen by nephrologist also  He has been able to lose few pounds in the past couple months and I believe by increasing Trulicity dose and increase his physical activities he will be able to lose more  The other option would be switching him from Trulicity to Meilimei  He will double check with his insurance coverage  2 -  Proteinuria; will be referred to a nephrologist    Plan:  All meds same except increase Trulicity to 4 5 mg/week  He was reminded about possible side effects  Eye exam report  RTV in 3 months with A1c and lipid panel  Refer to a nephrologist    CC: Diabetes    History of Present Illness     HPI:  Type II DM with no history of pancreatitis, dyslipidemia with no ASCVD events, hypertension     Review of Systems   Constitutional: Negative for appetite change, fatigue and unexpected weight change  HENT: Negative for trouble swallowing  Eyes: Negative for visual disturbance  Respiratory: Negative for cough, chest tightness and shortness of breath  Cardiovascular: Negative for chest pain, palpitations and leg swelling  Gastrointestinal: Negative for abdominal distention, abdominal pain, blood in stool, constipation, diarrhea, nausea and vomiting  Endocrine: Negative for polyphagia and polyuria  Genitourinary: Negative for dysuria and frequency  Skin: Negative for rash and wound  Neurological: Negative for weakness, numbness and headaches  Hematological: Does not bruise/bleed easily  Psychiatric/Behavioral: Negative for confusion         Historical Information   Past Medical History:   Diagnosis Date   • Anxiety    • Diabetes mellitus ([facility] )    • Diabetic eye exam ([facility] ) 09/2021   • Dyslipidemia    • ED (erectile dysfunction)    • Hypertension      Past Surgical History:   Procedure Laterality Date   • HERNIA REPAIR      scotal, childhood     Social History   Social History     Substance and Sexual Activity   Alcohol Use Not Currently    Comment: occasional     Social History     Substance and Sexual Activity   Drug Use Never     Social History     Tobacco Use   Smoking Status Former   Smokeless Tobacco Not on file     Family History:   Family History   Problem Relation Age of Onset   • Diabetes Mother    • Liver disease Father    • Diabetes Family        Meds/Allergies   Current Outpatient Medications   Medication Sig Dispense Refill   • albuterol (PROVENTIL HFA,VENTOLIN HFA) 90 mcg/act inhaler INHALE 2 PUFFS EVERY 6 HOURS AS NEEDED FOR WHEEZING     • aspirin 325 mg tablet Take 81 mg by mouth daily     • BD Pen Needle Maria Eugenia 2nd Gen 32G X 4 MM MISC USE AND DISCARD 1 PEN      NEEDLE DAILY 100 each 3   • celecoxib (CeleBREX) 200 mg capsule      • Continuous Blood Gluc Sensor (Dexcom G6 Sensor) MISC One every 10 days 9 each 3   • Continuous Blood Gluc Transmit (Dexcom G6 Transmitter) MISC One every 3 months 1 each 3   • dulaglutide (Trulicity) 4 5 PZ/6 2KW injection Inject 0 5 mL (4 5 mg total) under the skin every 7 days 6 mL 3   • fluticasone (FLONASE) 50 mcg/act nasal spray fluticasone propionate 50 mcg/actuation nasal spray,suspension     • glipiZIDE (GLUCOTROL) 5 mg tablet TAKE 1 AND 1/2 TABLETS     DAILY (= 7 5 MG DAILY) 135 tablet 3   • lisinopril (ZESTRIL) 30 mg tablet Take 1 tablet (30 mg total) by mouth 2 (two) times a day 180 tablet 3   • LORazepam (ATIVAN) 2 mg tablet Take 2 mg by mouth 2 (two) times a day     • metFORMIN (GLUCOPHAGE) 1000 MG "tablet Take 1 tablet (1,000 mg total) by mouth 2 (two) times a day with meals 180 tablet 3   • PARoxetine (PAXIL) 20 mg tablet Take 20 mg by mouth daily     • rosuvastatin (CRESTOR) 10 MG tablet Take 1 tablet (10 mg total) by mouth daily 90 tablet 4   • tadalafil (CIALIS) 20 MG tablet      • tadalafil (CIALIS) 5 MG tablet      • Toujeo SoloStar 300 units/mL CONCENTRATED U-300 injection pen (1-unit dial) Inject 30 Units under the skin daily 9 mL 3   • amoxicillin (AMOXIL) 875 mg tablet Take 875 mg by mouth 2 (two) times a day (Patient not taking: Reported on 8/29/2022)     • cetirizine (ZyrTEC) 10 mg tablet Take 10 mg by mouth daily (Patient not taking: Reported on 4/24/2023)       No current facility-administered medications for this visit  No Known Allergies    Objective   Vitals: Blood pressure 148/84, pulse 77, temperature 98 °F (36 7 °C), temperature source Temporal, height 6' 2\" (1 88 m), weight (!) 161 kg (355 lb 6 4 oz), SpO2 98 %  Physical Exam  Vitals reviewed  Constitutional:       Appearance: He is not ill-appearing  HENT:      Head: Normocephalic and atraumatic  Eyes:      Extraocular Movements: Extraocular movements intact  Neck:      Thyroid: No thyromegaly  Vascular: No carotid bruit  Cardiovascular:      Rate and Rhythm: Normal rate and regular rhythm  Pulses: Normal pulses  Heart sounds: Normal heart sounds  No murmur heard  No gallop  Pulmonary:      Effort: Pulmonary effort is normal       Breath sounds: Normal breath sounds  No wheezing, rhonchi or rales  Abdominal:      General: Bowel sounds are normal       Palpations: Abdomen is soft  Tenderness: There is no abdominal tenderness  Musculoskeletal:      Cervical back: No tenderness  Right lower leg: No edema  Left lower leg: No edema  Comments: Bilateral varicose veins(+)  Feet:      Comments: Under care of a podiatrist   Lymphadenopathy:      Cervical: No cervical adenopathy   " "  Skin:     General: Skin is warm  Coloration: Skin is not jaundiced  Findings: No rash  Neurological:      General: No focal deficit present  Mental Status: He is alert and oriented to person, place, and time  Cranial Nerves: No cranial nerve deficit  Psychiatric:         Mood and Affect: Mood normal          Behavior: Behavior normal          The history was obtained from the review of the chart, patient  Lab Results:   Lab Results   Component Value Date/Time    Hemoglobin A1C 6 4 (H) 04/18/2023 07:22 AM    Hemoglobin A1C 6 4 (H) 12/12/2022 07:53 AM    Hemoglobin A1C 6 7 (H) 08/19/2022 06:57 AM    WBC 6 82 12/12/2022 07:53 AM    Hemoglobin 13 5 12/12/2022 07:53 AM    Hematocrit 40 5 12/12/2022 07:53 AM    MCV 91 12/12/2022 07:53 AM    Platelets 671 21/23/3462 07:53 AM    BUN 21 12/12/2022 07:53 AM    BUN 25 08/19/2022 06:57 AM    Potassium 4 5 12/12/2022 07:53 AM    Potassium 4 8 08/19/2022 06:57 AM    Chloride 106 12/12/2022 07:53 AM    Chloride 101 08/19/2022 06:57 AM    CO2 25 12/12/2022 07:53 AM    CO2 24 08/19/2022 06:57 AM    Creatinine 0 92 12/12/2022 07:53 AM    Creatinine 0 98 08/19/2022 06:57 AM    AST 17 12/12/2022 07:53 AM    AST 24 08/19/2022 06:57 AM    ALT 31 12/12/2022 07:53 AM    ALT 38 08/19/2022 06:57 AM    Albumin 3 6 12/12/2022 07:53 AM    Albumin 4 0 08/19/2022 06:57 AM    HDL, Direct 45 12/12/2022 07:53 AM    Triglycerides 154 (H) 12/12/2022 07:53 AM           Imaging Studies: I have personally reviewed pertinent reports  Portions of the record may have been created with voice recognition software  Occasional wrong word or \"sound a like\" substitutions may have occurred due to the inherent limitations of voice recognition software  Read the chart carefully and recognize, using context, where substitutions have occurred    "

## 2023-04-24 NOTE — TELEPHONE ENCOUNTER
I called and i was unable to leave  message on patients answering machine for patient to give the office a call back abou tsheduling a Nephrology Consult ref by Dr Yanira Naik for Diabetic polyneuropathy associated with type 2 diabetes mellitus ([facility] )   Phone doesn't go thru

## 2023-04-27 ENCOUNTER — TELEPHONE (OUTPATIENT)
Dept: NEPHROLOGY | Facility: CLINIC | Age: 58
End: 2023-04-27

## 2023-04-28 ENCOUNTER — TELEPHONE (OUTPATIENT)
Dept: NEPHROLOGY | Facility: CLINIC | Age: 58
End: 2023-04-28

## 2023-04-28 NOTE — TELEPHONE ENCOUNTER
I called and i was unable to leave  message on patients answering machine for patient to give the office a call back abou tsheduling a Nephrology Consult ref by Dr James Hills for Diabetic polyneuropathy associated with type 2 diabetes mellitus ([facility]  Letter  Send to patient   Referral closed

## 2023-08-21 ENCOUNTER — APPOINTMENT (OUTPATIENT)
Dept: LAB | Facility: HOSPITAL | Age: 58
End: 2023-08-21
Payer: COMMERCIAL

## 2023-08-21 DIAGNOSIS — E11.42 DIABETIC POLYNEUROPATHY ASSOCIATED WITH TYPE 2 DIABETES MELLITUS (HCC): Chronic | ICD-10-CM

## 2023-08-21 DIAGNOSIS — E11.9 DIABETES MELLITUS WITHOUT COMPLICATION (HCC): ICD-10-CM

## 2023-08-21 DIAGNOSIS — E78.5 HYPERLIPIDEMIA, UNSPECIFIED HYPERLIPIDEMIA TYPE: ICD-10-CM

## 2023-08-21 LAB
ALBUMIN SERPL BCP-MCNC: 4.4 G/DL (ref 3.5–5)
ALP SERPL-CCNC: 49 U/L (ref 34–104)
ALT SERPL W P-5'-P-CCNC: 25 U/L (ref 7–52)
ANION GAP SERPL CALCULATED.3IONS-SCNC: 7 MMOL/L
AST SERPL W P-5'-P-CCNC: 22 U/L (ref 13–39)
BILIRUB DIRECT SERPL-MCNC: 0.12 MG/DL (ref 0–0.2)
BILIRUB SERPL-MCNC: 0.45 MG/DL (ref 0.2–1)
BUN SERPL-MCNC: 22 MG/DL (ref 5–25)
CALCIUM SERPL-MCNC: 9.7 MG/DL (ref 8.4–10.2)
CHLORIDE SERPL-SCNC: 104 MMOL/L (ref 96–108)
CHOLEST SERPL-MCNC: 132 MG/DL
CO2 SERPL-SCNC: 27 MMOL/L (ref 21–32)
CREAT SERPL-MCNC: 0.96 MG/DL (ref 0.6–1.3)
EST. AVERAGE GLUCOSE BLD GHB EST-MCNC: 146 MG/DL
GFR SERPL CREATININE-BSD FRML MDRD: 87 ML/MIN/1.73SQ M
GLUCOSE P FAST SERPL-MCNC: 89 MG/DL (ref 65–99)
HBA1C MFR BLD: 6.7 %
HDLC SERPL-MCNC: 48 MG/DL
LDLC SERPL CALC-MCNC: 53 MG/DL (ref 0–100)
NONHDLC SERPL-MCNC: 84 MG/DL
POTASSIUM SERPL-SCNC: 4.5 MMOL/L (ref 3.5–5.3)
PROT SERPL-MCNC: 7.5 G/DL (ref 6.4–8.4)
SODIUM SERPL-SCNC: 138 MMOL/L (ref 135–147)
TRIGL SERPL-MCNC: 156 MG/DL

## 2023-08-21 PROCEDURE — 83036 HEMOGLOBIN GLYCOSYLATED A1C: CPT

## 2023-08-21 PROCEDURE — 80048 BASIC METABOLIC PNL TOTAL CA: CPT

## 2023-08-21 PROCEDURE — 80076 HEPATIC FUNCTION PANEL: CPT

## 2023-08-21 PROCEDURE — 80061 LIPID PANEL: CPT

## 2023-08-21 PROCEDURE — 36415 COLL VENOUS BLD VENIPUNCTURE: CPT

## 2023-08-28 ENCOUNTER — TELEPHONE (OUTPATIENT)
Age: 58
End: 2023-08-28

## 2023-08-28 ENCOUNTER — OFFICE VISIT (OUTPATIENT)
Age: 58
End: 2023-08-28
Payer: COMMERCIAL

## 2023-08-28 VITALS
BODY MASS INDEX: 40.43 KG/M2 | DIASTOLIC BLOOD PRESSURE: 84 MMHG | HEIGHT: 74 IN | SYSTOLIC BLOOD PRESSURE: 134 MMHG | WEIGHT: 315 LBS

## 2023-08-28 DIAGNOSIS — E11.42 DIABETIC POLYNEUROPATHY ASSOCIATED WITH TYPE 2 DIABETES MELLITUS (HCC): Primary | Chronic | ICD-10-CM

## 2023-08-28 DIAGNOSIS — E11.65 INADEQUATELY CONTROLLED DIABETES MELLITUS (HCC): ICD-10-CM

## 2023-08-28 DIAGNOSIS — E01.0 THYROMEGALY: ICD-10-CM

## 2023-08-28 PROCEDURE — 99214 OFFICE O/P EST MOD 30 MIN: CPT | Performed by: INTERNAL MEDICINE

## 2023-08-28 RX ORDER — ACYCLOVIR 400 MG/1
1 TABLET ORAL
Qty: 9 EACH | Refills: 2 | Status: SHIPPED | OUTPATIENT
Start: 2023-08-28

## 2023-08-28 RX ORDER — ACYCLOVIR 400 MG/1
1 TABLET ORAL
Qty: 9 EACH | Refills: 2 | Status: SHIPPED | OUTPATIENT
Start: 2023-08-28 | End: 2023-08-28 | Stop reason: SDUPTHER

## 2023-08-28 NOTE — PROGRESS NOTES
Assessment/Plan:   1-T2DM: still well controlled with A1c of 6.7%  His CGM data review:  74% in target  Rest high  Average UO=843, variation 50  He gor steroid intra joint shot recently,  He is due for eye exam tomorrow. All meds same for time being  Switch to [address] (pt preference)    2-? Thyromegaly: his mother also has thyroid disease , willcheck his TSH and thyroid US.    3-dyslipidemia: with no ASVD events and acceptable LDL on statin. 4-proteinuria: has not seen at nephro yet           Subjective:     Patient ID: Edson Chapman is a 62 y.o. male. HPI    Review of Systems   Constitutional: Negative for appetite change, fatigue and unexpected weight change. HENT: Negative for trouble swallowing. Eyes: Negative for visual disturbance. Respiratory: Negative for cough, chest tightness and shortness of breath. Cardiovascular: Negative for chest pain, palpitations and leg swelling. Gastrointestinal: Negative for blood in stool, constipation, diarrhea, nausea and vomiting. Endocrine: Negative for polyphagia and polyuria. Genitourinary: Negative for dysuria, frequency and genital sores. Musculoskeletal: Positive for arthralgias. Knee pain , gt steroid  Injection and better. Skin: Negative for rash and wound. Neurological: Negative for weakness, numbness and headaches. Hematological: Does not bruise/bleed easily. Psychiatric/Behavioral: Negative for confusion. Objective:     Physical Exam  Vitals reviewed. Constitutional:       Appearance: Normal appearance. He is not ill-appearing. HENT:      Head: Normocephalic and atraumatic. Eyes:      Extraocular Movements: Extraocular movements intact. Neck:      Thyroid: No thyromegaly. Vascular: No carotid bruit. Cardiovascular:      Rate and Rhythm: Normal rate and regular rhythm. Pulses: Normal pulses. Heart sounds: Normal heart sounds. No murmur heard. No gallop.    Pulmonary:      Effort: Pulmonary effort is normal.      Breath sounds: Normal breath sounds. No wheezing or rales. Abdominal:      General: Bowel sounds are normal.      Palpations: Abdomen is soft. There is no mass. Musculoskeletal:      Cervical back: No tenderness. Right lower leg: No edema. Left lower leg: No edema. Comments: Lower leg varicose veins. Lymphadenopathy:      Cervical: No cervical adenopathy. Skin:     General: Skin is warm. Coloration: Skin is not jaundiced. Findings: No rash. Neurological:      General: No focal deficit present. Mental Status: He is alert and oriented to person, place, and time.    Psychiatric:         Mood and Affect: Mood normal.         Behavior: Behavior normal.

## 2023-08-28 NOTE — TELEPHONE ENCOUNTER
Continuous Blood Gluc Sensor (Dexcom G7 Sensor      3mth supply, with 3 refills    Patient wants this sent to mail order was sent to local pharmacy and will cancel that order    Please call when this is sent to mail order

## 2023-09-05 DIAGNOSIS — E11.65 INADEQUATELY CONTROLLED DIABETES MELLITUS (HCC): ICD-10-CM

## 2023-09-06 RX ORDER — PEN NEEDLE, DIABETIC 32GX 5/32"
NEEDLE, DISPOSABLE MISCELLANEOUS
Qty: 90 EACH | Refills: 1 | Status: SHIPPED | OUTPATIENT
Start: 2023-09-06

## 2023-11-07 ENCOUNTER — TELEPHONE (OUTPATIENT)
Dept: NEPHROLOGY | Facility: CLINIC | Age: 58
End: 2023-11-07

## 2023-11-07 NOTE — TELEPHONE ENCOUNTER
I called and spoke with patient as per patient he stated that he will give the office a call back to schedule a Nephrology Consult ref by Dr. Clara Jensen for Diabetic polyneuropathy associated with type 2 diabetes mellitus ([address] [facility]).

## 2024-01-05 ENCOUNTER — TELEPHONE (OUTPATIENT)
Dept: OBGYN CLINIC | Facility: CLINIC | Age: 59
End: 2024-01-05

## 2024-01-20 ENCOUNTER — APPOINTMENT (EMERGENCY)
Dept: CT IMAGING | Facility: HOSPITAL | Age: 59
End: 2024-01-20
Payer: COMMERCIAL

## 2024-01-20 ENCOUNTER — HOSPITAL ENCOUNTER (EMERGENCY)
Facility: HOSPITAL | Age: 59
Discharge: HOME | End: 2024-01-20
Attending: EMERGENCY MEDICINE
Payer: COMMERCIAL

## 2024-01-20 ENCOUNTER — APPOINTMENT (EMERGENCY)
Dept: RADIOLOGY | Facility: HOSPITAL | Age: 59
End: 2024-01-20
Payer: COMMERCIAL

## 2024-01-20 VITALS
RESPIRATION RATE: 22 BRPM | SYSTOLIC BLOOD PRESSURE: 155 MMHG | OXYGEN SATURATION: 97 % | WEIGHT: 315 LBS | DIASTOLIC BLOOD PRESSURE: 76 MMHG | HEIGHT: 73 IN | BODY MASS INDEX: 41.75 KG/M2 | TEMPERATURE: 98.9 F | HEART RATE: 79 BPM

## 2024-01-20 DIAGNOSIS — R07.9 CHEST PAIN, UNSPECIFIED: Primary | ICD-10-CM

## 2024-01-20 DIAGNOSIS — M54.9 BACK PAIN: ICD-10-CM

## 2024-01-20 DIAGNOSIS — E83.42 HYPOMAGNESEMIA: ICD-10-CM

## 2024-01-20 LAB
2HR DELTA HS TROPONIN: 0 NG/L
ALBUMIN SERPL BCP-MCNC: 4.5 G/DL (ref 3.5–5)
ALP SERPL-CCNC: 47 U/L (ref 34–104)
ALT SERPL W P-5'-P-CCNC: 25 U/L (ref 7–52)
ANION GAP SERPL CALCULATED.3IONS-SCNC: 9 MMOL/L
AST SERPL W P-5'-P-CCNC: 20 U/L (ref 13–39)
ATRIAL RATE: 82 BPM
ATRIAL RATE: 83 BPM
BASOPHILS # BLD AUTO: 0.01 THOUSANDS/ÂΜL (ref 0–0.1)
BASOPHILS NFR BLD AUTO: 0 % (ref 0–1)
BILIRUB DIRECT SERPL-MCNC: 0.09 MG/DL (ref 0–0.2)
BILIRUB SERPL-MCNC: 0.5 MG/DL (ref 0.2–1)
BUN SERPL-MCNC: 22 MG/DL (ref 5–25)
CALCIUM SERPL-MCNC: 9.6 MG/DL (ref 8.4–10.2)
CARDIAC TROPONIN I PNL SERPL HS: 5 NG/L
CARDIAC TROPONIN I PNL SERPL HS: 5 NG/L
CHLORIDE SERPL-SCNC: 103 MMOL/L (ref 96–108)
CO2 SERPL-SCNC: 23 MMOL/L (ref 21–32)
CREAT SERPL-MCNC: 0.95 MG/DL (ref 0.6–1.3)
EOSINOPHIL # BLD AUTO: 0.02 THOUSAND/ÂΜL (ref 0–0.61)
EOSINOPHIL NFR BLD AUTO: 0 % (ref 0–6)
ERYTHROCYTE [DISTWIDTH] IN BLOOD BY AUTOMATED COUNT: 13.4 % (ref 11.6–15.1)
GFR SERPL CREATININE-BSD FRML MDRD: 87 ML/MIN/1.73SQ M
GLUCOSE SERPL-MCNC: 145 MG/DL (ref 65–140)
HCT VFR BLD AUTO: 41.9 % (ref 36.5–49.3)
HGB BLD-MCNC: 14.1 G/DL (ref 12–17)
IMM GRANULOCYTES # BLD AUTO: 0.02 THOUSAND/UL (ref 0–0.2)
IMM GRANULOCYTES NFR BLD AUTO: 0 % (ref 0–2)
LYMPHOCYTES # BLD AUTO: 1.09 THOUSANDS/ÂΜL (ref 0.6–4.47)
LYMPHOCYTES NFR BLD AUTO: 15 % (ref 14–44)
MAGNESIUM SERPL-MCNC: 1.7 MG/DL (ref 1.9–2.7)
MCH RBC QN AUTO: 30.4 PG (ref 26.8–34.3)
MCHC RBC AUTO-ENTMCNC: 33.7 G/DL (ref 31.4–37.4)
MCV RBC AUTO: 90 FL (ref 82–98)
MONOCYTES # BLD AUTO: 0.28 THOUSAND/ÂΜL (ref 0.17–1.22)
MONOCYTES NFR BLD AUTO: 4 % (ref 4–12)
NEUTROPHILS # BLD AUTO: 5.87 THOUSANDS/ÂΜL (ref 1.85–7.62)
NEUTS SEG NFR BLD AUTO: 81 % (ref 43–75)
NRBC BLD AUTO-RTO: 0 /100 WBCS
P AXIS: -4 DEGREES
P AXIS: 10 DEGREES
PLATELET # BLD AUTO: 198 THOUSANDS/UL (ref 149–390)
PMV BLD AUTO: 10.4 FL (ref 8.9–12.7)
POTASSIUM SERPL-SCNC: 4.7 MMOL/L (ref 3.5–5.3)
PR INTERVAL: 120 MS
PR INTERVAL: 132 MS
PROT SERPL-MCNC: 7.7 G/DL (ref 6.4–8.4)
QRS AXIS: 18 DEGREES
QRS AXIS: 31 DEGREES
QRSD INTERVAL: 104 MS
QRSD INTERVAL: 98 MS
QT INTERVAL: 366 MS
QT INTERVAL: 370 MS
QTC INTERVAL: 427 MS
QTC INTERVAL: 434 MS
RBC # BLD AUTO: 4.64 MILLION/UL (ref 3.88–5.62)
SODIUM SERPL-SCNC: 135 MMOL/L (ref 135–147)
T WAVE AXIS: 21 DEGREES
T WAVE AXIS: 4 DEGREES
VENTRICULAR RATE: 82 BPM
VENTRICULAR RATE: 83 BPM
WBC # BLD AUTO: 7.29 THOUSAND/UL (ref 4.31–10.16)

## 2024-01-20 PROCEDURE — 71046 X-RAY EXAM CHEST 2 VIEWS: CPT

## 2024-01-20 PROCEDURE — 83735 ASSAY OF MAGNESIUM: CPT | Performed by: EMERGENCY MEDICINE

## 2024-01-20 PROCEDURE — 96366 THER/PROPH/DIAG IV INF ADDON: CPT

## 2024-01-20 PROCEDURE — 96361 HYDRATE IV INFUSION ADD-ON: CPT

## 2024-01-20 PROCEDURE — 96365 THER/PROPH/DIAG IV INF INIT: CPT

## 2024-01-20 PROCEDURE — 99285 EMERGENCY DEPT VISIT HI MDM: CPT | Performed by: EMERGENCY MEDICINE

## 2024-01-20 PROCEDURE — 80048 BASIC METABOLIC PNL TOTAL CA: CPT | Performed by: EMERGENCY MEDICINE

## 2024-01-20 PROCEDURE — 80076 HEPATIC FUNCTION PANEL: CPT | Performed by: EMERGENCY MEDICINE

## 2024-01-20 PROCEDURE — 84484 ASSAY OF TROPONIN QUANT: CPT | Performed by: EMERGENCY MEDICINE

## 2024-01-20 PROCEDURE — 93005 ELECTROCARDIOGRAM TRACING: CPT

## 2024-01-20 PROCEDURE — 74174 CTA ABD&PLVS W/CONTRAST: CPT

## 2024-01-20 PROCEDURE — 85025 COMPLETE CBC W/AUTO DIFF WBC: CPT | Performed by: EMERGENCY MEDICINE

## 2024-01-20 PROCEDURE — 71275 CT ANGIOGRAPHY CHEST: CPT

## 2024-01-20 PROCEDURE — 36415 COLL VENOUS BLD VENIPUNCTURE: CPT | Performed by: EMERGENCY MEDICINE

## 2024-01-20 PROCEDURE — 99285 EMERGENCY DEPT VISIT HI MDM: CPT

## 2024-01-20 RX ORDER — MAGNESIUM SULFATE HEPTAHYDRATE 40 MG/ML
2 INJECTION, SOLUTION INTRAVENOUS ONCE
Status: COMPLETED | OUTPATIENT
Start: 2024-01-20 | End: 2024-01-20

## 2024-01-20 RX ORDER — ASPIRIN 81 MG/1
324 TABLET, CHEWABLE ORAL ONCE
Status: COMPLETED | OUTPATIENT
Start: 2024-01-20 | End: 2024-01-20

## 2024-01-20 RX ADMIN — IOHEXOL 100 ML: 350 INJECTION, SOLUTION INTRAVENOUS at 10:44

## 2024-01-20 RX ADMIN — MAGNESIUM SULFATE HEPTAHYDRATE 2 G: 40 INJECTION, SOLUTION INTRAVENOUS at 11:25

## 2024-01-20 RX ADMIN — ASPIRIN 324 MG: 81 TABLET, CHEWABLE ORAL at 11:25

## 2024-01-20 RX ADMIN — SODIUM CHLORIDE 500 ML: 0.9 INJECTION, SOLUTION INTRAVENOUS at 10:08

## 2024-01-20 NOTE — ED PROVIDER NOTES
History  Chief Complaint   Patient presents with    Chest Pain     Patient co chest pain that started last night. Patient reports starts in back and radiates to chest. Patient reports intermittent cold sweats over night.      Patient is a 58-year-old male with past medical history significant for diabetes, hypertension, hyperlipidemia, anxiety, presents to the emergency department for chest pain as well as back pain.  Patient states that yesterday he felt discomfort in his lower central chest that lasted about 20 to 30 minutes.  He states he does have chronic back pain and was also feeling pain in his upper back but did not think much of it because of his chronic back pain.  He reports today, waking up early to go over to his mother-in-law's house and when he returned home, he started breaking out into cold sweats.  He states he did feel some discomfort on the left side of his lateral chest but that too has now resolved.  He denies any current anterior, lateral or posterior thoracic pain.  He denies any new dyspnea, palpitations, recent fevers, chills, cough, hemoptysis, URI symptoms, abdominal pain or distention, nausea, vomiting, change in bowel habits, urinary symptoms, skin rash or color change, leg pain or swelling, lateralizing extremity weakness or paresthesia or other focal neurologic deficits.  He denies any personal cardiac history.  Father has history of heart valve replacement in his 70s.  Patient denies smoking.  He normally takes 81 mg of aspirin daily but did not take any of his morning medications including his aspirin.      History provided by:  Patient   used: No    Chest Pain  Associated symptoms: back pain and diaphoresis    Associated symptoms: no abdominal pain, no cough, no dizziness, no fever, no headache, no nausea, no numbness, no palpitations, no shortness of breath, not vomiting and no weakness        Prior to Admission Medications   Prescriptions Last Dose Informant  Patient Reported? Taking?   BD Pen Needle Maria Eugenia 2nd Gen 32G X 4 MM MISC   No No   Sig: USE TO INJECT              SUBCUTANEOUSLY EVERY       MORNING.   Continuous Blood Gluc Sensor (Dexcom G6 Sensor) MISC   No No   Sig: CHANGE EVERY 10 DAYS   Continuous Blood Gluc Transmit (Dexcom G6 Transmitter) MISC  Self No No   Sig: One every 3 months   LORazepam (ATIVAN) 2 mg tablet  Self Yes No   Sig: Take 2 mg by mouth 2 (two) times a day   PARoxetine (PAXIL) 20 mg tablet  Self Yes No   Sig: Take 20 mg by mouth daily   Toujeo SoloStar 300 units/mL CONCENTRATED U-300 injection pen (1-unit dial)   No No   Sig: Inject 30 Units under the skin daily   albuterol (PROVENTIL HFA,VENTOLIN HFA) 90 mcg/act inhaler  Self Yes No   Sig: INHALE 2 PUFFS EVERY 6 HOURS AS NEEDED FOR WHEEZING   amoxicillin (AMOXIL) 875 mg tablet  Self Yes No   Sig: Take 875 mg by mouth 2 (two) times a day   Patient not taking: Reported on 8/29/2022   aspirin 325 mg tablet  Self Yes No   Sig: Take 81 mg by mouth daily   celecoxib (CeleBREX) 200 mg capsule  Self Yes No   cetirizine (ZyrTEC) 10 mg tablet   Yes No   Sig: Take 10 mg by mouth daily   Patient not taking: Reported on 4/24/2023   dulaglutide (Trulicity) 4.5 MG/0.5ML injection   Yes No   Sig: Inject 4.5 mg under the skin every 7 days   fluticasone (FLONASE) 50 mcg/act nasal spray  Self Yes No   Sig: fluticasone propionate 50 mcg/actuation nasal spray,suspension   glipiZIDE (GLUCOTROL) 5 mg tablet  Self No No   Sig: TAKE 1 AND 1/2 TABLETS     DAILY (= 7.5 MG DAILY)   lisinopril (ZESTRIL) 30 mg tablet   No No   Sig: Take 1 tablet (30 mg total) by mouth 2 (two) times a day   metFORMIN (GLUCOPHAGE) 1000 MG tablet   No No   Sig: Take 1 tablet (1,000 mg total) by mouth 2 (two) times a day with meals   rosuvastatin (CRESTOR) 10 MG tablet   No No   Sig: Take 1 tablet (10 mg total) by mouth daily   tadalafil (CIALIS) 20 MG tablet  Self Yes No   tadalafil (CIALIS) 5 MG tablet  Self Yes No      Facility-Administered  Medications: None       Past Medical History:   Diagnosis Date    Anxiety     Diabetes mellitus (HCC)     Diabetic eye exam (HCC) 09/2021    Dyslipidemia     ED (erectile dysfunction)     Hypertension        Past Surgical History:   Procedure Laterality Date    HERNIA REPAIR      scotal, childhood       Family History   Problem Relation Age of Onset    Diabetes Mother     Liver disease Father     Diabetes Family      I have reviewed and agree with the history as documented.    E-Cigarette/Vaping    E-Cigarette Use Never User      E-Cigarette/Vaping Substances    Nicotine No     THC No     CBD No     Flavoring No     Other No     Unknown No      Social History     Tobacco Use    Smoking status: Former   Vaping Use    Vaping status: Never Used   Substance Use Topics    Alcohol use: Not Currently     Comment: occasional    Drug use: Never       Review of Systems   Constitutional:  Positive for diaphoresis. Negative for chills and fever.   HENT:  Negative for congestion, ear pain, rhinorrhea and sore throat.    Respiratory:  Negative for cough, chest tightness, shortness of breath and wheezing.    Cardiovascular:  Positive for chest pain. Negative for palpitations and leg swelling.   Gastrointestinal:  Negative for abdominal pain, constipation, diarrhea, nausea and vomiting.   Genitourinary:  Negative for dysuria, flank pain, frequency and hematuria.   Musculoskeletal:  Positive for back pain. Negative for neck pain and neck stiffness.   Skin:  Negative for color change, pallor, rash and wound.   Allergic/Immunologic: Negative for immunocompromised state.   Neurological:  Negative for dizziness, syncope, weakness, light-headedness, numbness and headaches.   Hematological:  Negative for adenopathy. Does not bruise/bleed easily.   Psychiatric/Behavioral:  Negative for confusion, decreased concentration and sleep disturbance.    All other systems reviewed and are negative.      Physical Exam  Physical Exam  Vitals and  nursing note reviewed.   Constitutional:       General: He is not in acute distress.     Appearance: Normal appearance. He is well-developed. He is obese. He is not ill-appearing, toxic-appearing or diaphoretic.   HENT:      Head: Normocephalic and atraumatic.      Right Ear: External ear normal.      Left Ear: External ear normal.      Nose: Nose normal.      Mouth/Throat:      Mouth: Mucous membranes are moist.      Pharynx: Oropharynx is clear.   Eyes:      Extraocular Movements: Extraocular movements intact.      Conjunctiva/sclera: Conjunctivae normal.   Neck:      Vascular: No JVD.   Cardiovascular:      Rate and Rhythm: Normal rate and regular rhythm.      Pulses: Normal pulses.      Heart sounds: Normal heart sounds. No murmur heard.     No friction rub. No gallop.   Pulmonary:      Effort: Pulmonary effort is normal. No respiratory distress.      Breath sounds: Normal breath sounds. No wheezing, rhonchi or rales.   Abdominal:      General: There is no distension.      Palpations: Abdomen is soft.      Tenderness: There is no abdominal tenderness. There is no guarding or rebound.   Musculoskeletal:         General: No swelling or tenderness. Normal range of motion.      Cervical back: Normal range of motion and neck supple. No rigidity.   Skin:     General: Skin is warm and dry.      Coloration: Skin is not pale.      Findings: No erythema or rash.   Neurological:      General: No focal deficit present.      Mental Status: He is alert and oriented to person, place, and time.      Sensory: No sensory deficit.      Motor: No weakness.   Psychiatric:         Mood and Affect: Mood normal.         Behavior: Behavior normal.         Vital Signs  ED Triage Vitals [01/20/24 0930]   Temperature Pulse Respirations Blood Pressure SpO2   98.9 °F (37.2 °C) 80 18 (!) 200/95 98 %      Temp Source Heart Rate Source Patient Position - Orthostatic VS BP Location FiO2 (%)   Oral Monitor Sitting Left arm --      Pain Score        --         Vitals:    01/20/24 0948 01/20/24 1015 01/20/24 1017 01/20/24 1130   BP: 158/90 97/55 100/59 167/76   BP Location: Right arm   Left arm   Pulse: 83 79  82   Resp: 18 18 18   Temp:       TempSrc:       SpO2: 98% 94%  97%   Weight:       Height:              Visual Acuity      ED Medications  Medications   magnesium sulfate 2 g/50 mL IVPB (premix) 2 g (2 g Intravenous New Bag 1/20/24 1125)   sodium chloride 0.9 % bolus 500 mL (500 mL Intravenous New Bag 1/20/24 1008)   aspirin chewable tablet 324 mg (324 mg Oral Given 1/20/24 1125)   iohexol (OMNIPAQUE) 350 MG/ML injection (MULTI-DOSE) 100 mL (100 mL Intravenous Given 1/20/24 1044)       Diagnostic Studies  Results Reviewed       Procedure Component Value Units Date/Time    HS Troponin I 2hr [[id number]]  (Normal) Collected: 01/20/24 1143    Lab Status: Final result Specimen: Blood from Arm, Right Updated: 01/20/24 1226     hs TnI 2hr 5 ng/L      Delta 2hr hsTnI 0 ng/L     Basic metabolic panel [[id number]]  (Abnormal) Collected: 01/20/24 1007    Lab Status: Final result Specimen: Blood from Arm, Right Updated: 01/20/24 1046     Sodium 135 mmol/L      Potassium 4.7 mmol/L      Chloride 103 mmol/L      CO2 23 mmol/L      ANION GAP 9 mmol/L      BUN 22 mg/dL      Creatinine 0.95 mg/dL      Glucose 145 mg/dL      Calcium 9.6 mg/dL      eGFR 87 ml/min/1.73sq m     Narrative:      National Kidney Disease Foundation guidelines for Chronic Kidney Disease (CKD):     Stage 1 with normal or high GFR (GFR > 90 mL/min/1.73 square meters)    Stage 2 Mild CKD (GFR = 60-89 mL/min/1.73 square meters)    Stage 3A Moderate CKD (GFR = 45-59 mL/min/1.73 square meters)    Stage 3B Moderate CKD (GFR = 30-44 mL/min/1.73 square meters)    Stage 4 Severe CKD (GFR = 15-29 mL/min/1.73 square meters)    Stage 5 End Stage CKD (GFR <15 mL/min/1.73 square meters)  Note: GFR calculation is accurate only with a steady state creatinine    Hepatic function panel [[id number]]  (Normal)  Collected: 01/20/24 1007    Lab Status: Final result Specimen: Blood from Arm, Right Updated: 01/20/24 1046     Total Bilirubin 0.50 mg/dL      Bilirubin, Direct 0.09 mg/dL      Alkaline Phosphatase 47 U/L      AST 20 U/L      ALT 25 U/L      Total Protein 7.7 g/dL      Albumin 4.5 g/dL     Magnesium [[id number]]  (Abnormal) Collected: 01/20/24 1007    Lab Status: Final result Specimen: Blood from Arm, Right Updated: 01/20/24 1046     Magnesium 1.7 mg/dL     HS Troponin 0hr (reflex protocol) [[id number]]  (Normal) Collected: 01/20/24 1007    Lab Status: Final result Specimen: Blood from Arm, Right Updated: 01/20/24 1042     hs TnI 0hr 5 ng/L     CBC and differential [[id number]]  (Abnormal) Collected: 01/20/24 1007    Lab Status: Final result Specimen: Blood from Arm, Right Updated: 01/20/24 1015     WBC 7.29 Thousand/uL      RBC 4.64 Million/uL      Hemoglobin 14.1 g/dL      Hematocrit 41.9 %      MCV 90 fL      MCH 30.4 pg      MCHC 33.7 g/dL      RDW 13.4 %      MPV 10.4 fL      Platelets 198 Thousands/uL      nRBC 0 /100 WBCs      Neutrophils Relative 81 %      Immat GRANS % 0 %      Lymphocytes Relative 15 %      Monocytes Relative 4 %      Eosinophils Relative 0 %      Basophils Relative 0 %      Neutrophils Absolute 5.87 Thousands/µL      Immature Grans Absolute 0.02 Thousand/uL      Lymphocytes Absolute 1.09 Thousands/µL      Monocytes Absolute 0.28 Thousand/µL      Eosinophils Absolute 0.02 Thousand/µL      Basophils Absolute 0.01 Thousands/µL                    CTA dissection protocol chest abdomen pelvis w wo contrast   Final Result by Grayson Villarreal MD (01/20 1113)      No CT evidence of aortic aneurysm or dissection. Atheromatous calcification of the infrarenal abdominal aorta.      No acute thoracic pathology.      No acute CT findings in the abdomen or pelvis. Additional findings as above.                  Workstation performed: [employer]         XR chest 2 views   ED Interpretation by Lali Navarro  DO [place] (01/20 1036)   No acute abnormality in the chest.                 Procedures  ECG 12 Lead Documentation Only    Date/Time: 1/20/2024 9:32 AM    Performed by: Lali Bowman DO  Authorized by: Lali Bowman DO    ECG reviewed by me, the ED Provider: yes    Patient location:  ED  Previous ECG:     Previous ECG:  Unavailable  Interpretation:     Interpretation: normal    Rate:     ECG rate:  83    ECG rate assessment: normal    Rhythm:     Rhythm: sinus rhythm    Ectopy:     Ectopy: none    QRS:     QRS axis:  Normal    QRS intervals:  Normal  Conduction:     Conduction: normal    ST segments:     ST segments:  Normal  T waves:     T waves: inverted      Inverted:  III  ECG 12 Lead Documentation Only    Date/Time: 1/20/2024 10:14 AM    Performed by: Lali Bowman DO  Authorized by: Lali Bowman DO    ECG reviewed by me, the ED Provider: yes    Patient location:  ED  Rate:     ECG rate:  82    ECG rate assessment: normal    Rhythm:     Rhythm: sinus rhythm    Ectopy:     Ectopy: none    QRS:     QRS axis:  Normal    QRS intervals:  Normal  Conduction:     Conduction: normal    ST segments:     ST segments:  Normal  T waves:     T waves: inverted      Inverted:  III           ED Course  ED Course as of 01/20/24 1313   Sat Jan 20, 2024   1013 Per nursing, patient started to get lightheaded and became clammy and mildly diaphoretic.  No recurrent chest pain or back pain at this time.  Repeat EKG obtained and unchanged.   1049 hs TnI 0hr: 5   1232 hs TnI 2hr: 5   1232 Repeat troponin unchanged.  Patient updated of workup thus far including CT findings of infrarenal abdominal aorta atherosclerosis but no aneurysm or dissection.  Waiting on magnesium infusion to be completed and then will discharge with ambulatory referral to cardiology.  Patient currently asymptomatic.   1312 Patient reassessed and updated of normal repeat troponin.  Magnesium infusion has been  completed.  Patient remains asymptomatic.  Ambulatory referral to cardiology sent and also advised close PCP follow-up.  Discussed ED return parameters.             HEART Risk Score      Flowsheet Row Most Recent Value   Heart Score Risk Calculator    History 1 Filed at: 01/20/2024 1233   ECG 0 Filed at: 01/20/2024 1233   Age 1 Filed at: 01/20/2024 1233   Risk Factors 2 Filed at: 01/20/2024 1233   Troponin 0 Filed at: 01/20/2024 1233   HEART Score 4 Filed at: 01/20/2024 1233                          SBIRT 20yo+      Flowsheet Row Most Recent Value   Initial Alcohol Screen: US AUDIT-C     1. How often do you have a drink containing alcohol? 0 Filed at: 01/20/2024 0934   2. How many drinks containing alcohol do you have on a typical day you are drinking?  0 Filed at: 01/20/2024 0934   3a. Male UNDER 65: How often do you have five or more drinks on one occasion? 0 Filed at: 01/20/2024 0934   Audit-C Score 0 Filed at: 01/20/2024 0934   ANNA: How many times in the past year have you...    Used an illegal drug or used a prescription medication for non-medical reasons? Never Filed at: 01/20/2024 0934                      Medical Decision Making  58-year-old male presents to the ED for acute chest pain that started yesterday as well as pain in his left lateral chest and upper back.  Patient currently is pain-free but did have this pain intermittently and it was associated with cold sweats this morning.  Differential includes musculoskeletal pain, anxiety, ACS or angina, aortic dissection, GERD, esophagitis, indigestion, less likely PE.  Will evaluate with cardiac labs, CTA dissection study, EKG.  Pending dissection study, will provide 324 mg of aspirin.  If initial workup is unremarkable, will keep for 2-hour repeat troponin and if stable, provide referral for outpatient cardiology follow-up and outpatient stress test.    Amount and/or Complexity of Data Reviewed  Labs: ordered. Decision-making details documented in ED  Course.  Radiology: ordered and independent interpretation performed. Decision-making details documented in ED Course.  ECG/medicine tests: ordered and independent interpretation performed. Decision-making details documented in ED Course.    Risk  OTC drugs.  Prescription drug management.             Disposition  Final diagnoses:   Chest pain, unspecified   Back pain   Hypomagnesemia     Time reflects when diagnosis was documented in both MDM as applicable and the Disposition within this note       Time User Action Codes Description Comment    1/20/2024 12:33 PM Lali Bowman Add [R07.9] Chest pain, unspecified     1/20/2024 12:33 PM Lali Bowman Add [M54.9] Back pain     1/20/2024 12:33 PM Lali Bowman Add [E83.42] Hypomagnesemia           ED Disposition       ED Disposition   Discharge    Condition   Stable    Date/Time   Sat Jan 20, 2024 1233    Comment   Mike Caldwell discharge to home/self care.                   Follow-up Information       Follow up With Specialties Details Why Contact Info Additional Information    [facility] Schedule an appointment as soon as possible for a visit  As needed [address] [facility] [address]  [phone]       [facility] Schedule an appointment as soon as possible for a visit   [address]  [facility] [facility] [address] [phone] [facility] [address], [place], [place], [address] [phone]    [facility] Schedule an appointment as soon as possible for a visit   [address]  [address]  [facility] [address]  [phone] [facility], [address], [place], [place], [address] [phone]    [facility] Emergency  Medicine Go to  If symptoms worsen [address] [facility] [address]  [phone] [facility], [address] [place], [place], [address]            Patient's Medications   Discharge Prescriptions    No medications on file           PDMP Review       None            ED Provider  Electronically Signed by             Lali Bowman DO  01/20/24 3189

## 2024-01-20 NOTE — ED NOTES
Hospitalist Progress Note    NAME: Debi Beasley   :  1962   MRN:  [id number]       Assessment / Plan:    Perforated Viscus   treatment as per surgery  -Continue antibiotic therapy  -- started on clear liquid diet     ESRD on HD TTS  Nephrology Consult   continue hemodialysis    DMII  Thyroid cancer with metastasis to the brain and lung  HTN  HLD  GERD  Hx of TIA  SHOLA with chronic respiratory failure 2-3L NC  Hold all oral meds - can slowly resume once cleared for PO intake by Surgery  Pt has been taking Decadron 4mg q8hrs for edema associated with brain mets. Will Pharmacy to switch to IV while pt remains NPO. FS monitoring, SS       Code Status: As per primary team  DVT Prophylaxis: As per primary team         Subjective:     Chief Complaint / Reason for Physician Visit  . Discussed with RN events overnight. Patient stated he feels better,possible d/c to SNF VS hospice at [facility]     Review of Systems:  Symptom Y/N Comments  Symptom Y/N Comments   Fever/Chills n   Chest Pain n    Poor Appetite    Edema     Cough    Abdominal Pain y    Sputum    Joint Pain     SOB/NUÑEZ y   Pruritis/Rash     Nausea/vomit    Tolerating PT/OT     Diarrhea    Tolerating Diet n    Constipation n   Other       Could NOT obtain due to:      Objective:     VITALS:   Last 24hrs VS reviewed since prior progress note.  Most recent are:  Patient Vitals for the past 24 hrs:   Temp Pulse Resp BP SpO2   21 2104 97.9 °F (36.6 °C) -- 16 (!) 149/59 99 %   21 1736 98.1 °F (36.7 °C) 72 17 (!) 138/55 96 %   21 1100 98 °F (36.7 °C) 74 18 (!) 158/67 99 %   21 0756 97.8 °F (36.6 °C) 78 18 (!) 166/70 99 %   21 0340 -- -- -- (!) 154/49 --   21 0304 98.1 °F (36.7 °C) 81 18 (!) 177/71 99 %   21 -- -- -- -- 99 %   21 -- -- -- (!) 142/52 --       Intake/Output Summary (Last 24 hours) at 20213  Last data filed at 2021 1748  Gross per 24 hour   Intake 1075 ml   Output 275 ml Pt returned from XR.     Shae Chacko RN  01/20/24 0788     Net 800 ml        I had a face to face encounter and independently examined this patient on 11/21/2021, as outlined below:  PHYSICAL EXAM:  General: WD, WN. Alert, cooperative, no acute distress    EENT:  EOMI. Anicteric sclerae. MMM  Resp:  CTA bilaterally, no wheezing or rales. No accessory muscle use  CV:  Regular  rhythm,  No edema  GI:  Soft, Non distended, Non tender. +Bowel sounds  Neurologic:  Alert  Psych:   Good insight. Not anxious nor agitated  Skin:  No rashes. No jaundice    Reviewed most current lab test results and cultures  YES  Reviewed most current radiology test results   YES  Review and summation of old records today    NO  Reviewed patient's current orders and MAR    YES  PMH/SH reviewed - no change compared to H&P  ________________________________________________________________________  Care Plan discussed with:    Comments   Patient y    Family      RN y    Care Manager     Consultant                        Multidiciplinary team rounds were held today with , nursing, pharmacist and clinical coordinator. Patient's plan of care was discussed; medications were reviewed and discharge planning was addressed. ________________________________________________________________________  Total NON critical care TIME:  35    Minutes    Total CRITICAL CARE TIME Spent:   Minutes non procedure based      Comments   >50% of visit spent in counseling and coordination of care y    ________________________________________________________________________  Vivien Flores MD     Procedures: see electronic medical records for all procedures/Xrays and details which were not copied into this note but were reviewed prior to creation of Plan. LABS:  I reviewed today's most current labs and imaging studies.   Pertinent labs include:  Recent Labs     11/21/21  0159 11/20/21  0504 11/19/21  0551   WBC 15.6* 13.2* 12.5*   HGB 9.4* 9.9* 9.4*   HCT 28.1* 30.3* 28.7*   * 112* 113*     Recent Labs     11/21/21  0159 11/20/21  0504 11/19/21  0038 11/18/21  2319   * 139  --  131*   K 4.0 4.9  --  4.4    107  --  95*   CO2 28 26  --  26   * 129*  --  365*   BUN 31* 57*  --  45*   CREA 1.82* 2.70*  --  2.65*   CA 9.7 9.9  --  9.6   ALB  --   --   --  1.7*   TBILI  --   --   --  0.4   ALT  --   --   --  19   INR  --   --  1.0  --        Signed:  Gen Maher MD

## 2024-01-22 LAB
ATRIAL RATE: 82 BPM
ATRIAL RATE: 83 BPM
P AXIS: -4 DEGREES
P AXIS: 10 DEGREES
PR INTERVAL: 120 MS
PR INTERVAL: 132 MS
QRS AXIS: 18 DEGREES
QRS AXIS: 31 DEGREES
QRSD INTERVAL: 104 MS
QRSD INTERVAL: 98 MS
QT INTERVAL: 366 MS
QT INTERVAL: 370 MS
QTC INTERVAL: 427 MS
QTC INTERVAL: 434 MS
T WAVE AXIS: 21 DEGREES
T WAVE AXIS: 4 DEGREES
VENTRICULAR RATE: 82 BPM
VENTRICULAR RATE: 83 BPM

## 2024-01-30 ENCOUNTER — APPOINTMENT (OUTPATIENT)
Dept: LAB | Facility: HOSPITAL | Age: 59
End: 2024-01-30
Payer: COMMERCIAL

## 2024-01-30 DIAGNOSIS — E03.9 MYXEDEMA HEART DISEASE: ICD-10-CM

## 2024-01-30 DIAGNOSIS — E11.69 TYPE 2 DIABETES MELLITUS WITH OTHER SPECIFIED COMPLICATION, UNSPECIFIED WHETHER LONG TERM INSULIN USE (HCC): ICD-10-CM

## 2024-01-30 DIAGNOSIS — E11.42 DIABETIC POLYNEUROPATHY ASSOCIATED WITH TYPE 2 DIABETES MELLITUS (HCC): Chronic | ICD-10-CM

## 2024-01-30 DIAGNOSIS — E78.5 HYPERLIPIDEMIA, UNSPECIFIED HYPERLIPIDEMIA TYPE: ICD-10-CM

## 2024-01-30 DIAGNOSIS — E01.0 THYROMEGALY: ICD-10-CM

## 2024-01-30 DIAGNOSIS — D50.9 IRON DEFICIENCY ANEMIA, UNSPECIFIED IRON DEFICIENCY ANEMIA TYPE: ICD-10-CM

## 2024-01-30 DIAGNOSIS — I51.9 MYXEDEMA HEART DISEASE: ICD-10-CM

## 2024-01-30 LAB
ALBUMIN SERPL BCP-MCNC: 4.4 G/DL (ref 3.5–5)
ALP SERPL-CCNC: 45 U/L (ref 34–104)
ALT SERPL W P-5'-P-CCNC: 20 U/L (ref 7–52)
ANION GAP SERPL CALCULATED.3IONS-SCNC: 6 MMOL/L
AST SERPL W P-5'-P-CCNC: 16 U/L (ref 13–39)
BACTERIA UR QL AUTO: NORMAL /HPF
BILIRUB DIRECT SERPL-MCNC: 0.08 MG/DL (ref 0–0.2)
BILIRUB SERPL-MCNC: 0.45 MG/DL (ref 0.2–1)
BILIRUB UR QL STRIP: NEGATIVE
BUN SERPL-MCNC: 30 MG/DL (ref 5–25)
CALCIUM SERPL-MCNC: 9.6 MG/DL (ref 8.4–10.2)
CHLORIDE SERPL-SCNC: 104 MMOL/L (ref 96–108)
CHOLEST SERPL-MCNC: 129 MG/DL
CLARITY UR: CLEAR
CO2 SERPL-SCNC: 25 MMOL/L (ref 21–32)
COLOR UR: NORMAL
CREAT SERPL-MCNC: 1.05 MG/DL (ref 0.6–1.3)
CREAT UR-MCNC: 99.6 MG/DL
ERYTHROCYTE [DISTWIDTH] IN BLOOD BY AUTOMATED COUNT: 13.8 % (ref 11.6–15.1)
EST. AVERAGE GLUCOSE BLD GHB EST-MCNC: 140 MG/DL
GFR SERPL CREATININE-BSD FRML MDRD: 77 ML/MIN/1.73SQ M
GLUCOSE P FAST SERPL-MCNC: 117 MG/DL (ref 65–99)
GLUCOSE UR STRIP-MCNC: NEGATIVE MG/DL
HBA1C MFR BLD: 6.5 %
HCT VFR BLD AUTO: 40.8 % (ref 36.5–49.3)
HDLC SERPL-MCNC: 43 MG/DL
HGB BLD-MCNC: 13.6 G/DL (ref 12–17)
HGB UR QL STRIP.AUTO: NEGATIVE
IRON SATN MFR SERPL: 16 % (ref 15–50)
IRON SERPL-MCNC: 58 UG/DL (ref 50–212)
KETONES UR STRIP-MCNC: NEGATIVE MG/DL
LDLC SERPL CALC-MCNC: 50 MG/DL (ref 0–100)
LEUKOCYTE ESTERASE UR QL STRIP: NEGATIVE
MCH RBC QN AUTO: 30.3 PG (ref 26.8–34.3)
MCHC RBC AUTO-ENTMCNC: 33.3 G/DL (ref 31.4–37.4)
MCV RBC AUTO: 91 FL (ref 82–98)
MICROALBUMIN UR-MCNC: 48.3 MG/L
MICROALBUMIN/CREAT 24H UR: 48 MG/G CREATININE (ref 0–30)
NITRITE UR QL STRIP: NEGATIVE
NON-SQ EPI CELLS URNS QL MICRO: NORMAL /HPF
NONHDLC SERPL-MCNC: 86 MG/DL
PH UR STRIP.AUTO: 5.5 [PH]
PLATELET # BLD AUTO: 170 THOUSANDS/UL (ref 149–390)
PMV BLD AUTO: 10.6 FL (ref 8.9–12.7)
POTASSIUM SERPL-SCNC: 4.7 MMOL/L (ref 3.5–5.3)
PROT SERPL-MCNC: 7.5 G/DL (ref 6.4–8.4)
PROT UR STRIP-MCNC: NEGATIVE MG/DL
RBC # BLD AUTO: 4.49 MILLION/UL (ref 3.88–5.62)
RBC #/AREA URNS AUTO: NORMAL /HPF
SODIUM SERPL-SCNC: 135 MMOL/L (ref 135–147)
SP GR UR STRIP.AUTO: 1.02 (ref 1–1.03)
TIBC SERPL-MCNC: 359 UG/DL (ref 250–450)
TRIGL SERPL-MCNC: 181 MG/DL
TSH SERPL DL<=0.05 MIU/L-ACNC: 2.41 UIU/ML (ref 0.45–4.5)
UIBC SERPL-MCNC: 301 UG/DL (ref 155–355)
UROBILINOGEN UR STRIP-ACNC: <2 MG/DL
WBC # BLD AUTO: 7.94 THOUSAND/UL (ref 4.31–10.16)
WBC #/AREA URNS AUTO: NORMAL /HPF

## 2024-01-30 PROCEDURE — 36415 COLL VENOUS BLD VENIPUNCTURE: CPT

## 2024-01-30 PROCEDURE — 82570 ASSAY OF URINE CREATININE: CPT

## 2024-01-30 PROCEDURE — 83540 ASSAY OF IRON: CPT

## 2024-01-30 PROCEDURE — 80061 LIPID PANEL: CPT

## 2024-01-30 PROCEDURE — 83550 IRON BINDING TEST: CPT

## 2024-01-30 PROCEDURE — 85027 COMPLETE CBC AUTOMATED: CPT

## 2024-01-30 PROCEDURE — 80076 HEPATIC FUNCTION PANEL: CPT

## 2024-01-30 PROCEDURE — 82043 UR ALBUMIN QUANTITATIVE: CPT

## 2024-01-30 PROCEDURE — 84443 ASSAY THYROID STIM HORMONE: CPT

## 2024-01-30 PROCEDURE — 80048 BASIC METABOLIC PNL TOTAL CA: CPT

## 2024-01-30 PROCEDURE — 81001 URINALYSIS AUTO W/SCOPE: CPT

## 2024-01-30 PROCEDURE — 83036 HEMOGLOBIN GLYCOSYLATED A1C: CPT

## 2024-02-05 ENCOUNTER — CONSULT (OUTPATIENT)
Dept: CARDIOLOGY CLINIC | Facility: CLINIC | Age: 59
End: 2024-02-05
Payer: COMMERCIAL

## 2024-02-05 VITALS
HEIGHT: 73 IN | HEART RATE: 62 BPM | RESPIRATION RATE: 16 BRPM | WEIGHT: 315 LBS | OXYGEN SATURATION: 98 % | BODY MASS INDEX: 41.75 KG/M2 | SYSTOLIC BLOOD PRESSURE: 138 MMHG | DIASTOLIC BLOOD PRESSURE: 80 MMHG

## 2024-02-05 DIAGNOSIS — I10 ESSENTIAL HYPERTENSION: ICD-10-CM

## 2024-02-05 DIAGNOSIS — E66.01 MORBID OBESITY DUE TO EXCESS CALORIES (HCC): ICD-10-CM

## 2024-02-05 DIAGNOSIS — R07.9 CHEST PAIN, UNSPECIFIED TYPE: Primary | ICD-10-CM

## 2024-02-05 DIAGNOSIS — E78.2 MIXED HYPERLIPIDEMIA: ICD-10-CM

## 2024-02-05 PROCEDURE — 99204 OFFICE O/P NEW MOD 45 MIN: CPT | Performed by: INTERNAL MEDICINE

## 2024-02-05 RX ORDER — ASPIRIN 81 MG/1
81 TABLET, CHEWABLE ORAL DAILY
Start: 2024-02-05

## 2024-02-05 NOTE — PROGRESS NOTES
CARDIOLOGY CONSULTATION  [facility]  [address] [place], [place] [id number]  Tel: [phone]      NAME: Mike Caldwell  AGE: 58 y.o.  SEX: male  : 1965  MRN: [id number]      Chief Complaint:  Chief Complaint   Patient presents with    New Patient Visit         History of Present Illness:   Referred by ER physician.    58-year-old morbidly obese male with DM, HTN, HLP who went to the ER for a complaint of chest pain.  Patient states that the chest pain was in the mid chest, mild to moderate in intensity, associated with cold sweats, did not radiate.  Patient denies SOB, palpitations, lightheadedness, syncope, swelling feet, orthopnea, PND, claudication.    Essential hypertension -  Has been hypertensive for many years.  Taking medications regularly.  Denies lightheadedness, headache, medication side effects.      Mixed hyperlipidemia -  Has had hyperlipidemia for many years.  Taking statin regularly along with diet control.  Denies myalgia.  PCP closely monitoring the blood work.    Morbid obesity -  Trying to lose weight.      Past Medical History:  Past Medical History:   Diagnosis Date    Anxiety     Diabetes mellitus (HCC)     Diabetic eye exam (HCC) 2021    Dyslipidemia     ED (erectile dysfunction)     Hypertension          Past Surgical History:  Past Surgical History:   Procedure Laterality Date    HERNIA REPAIR      scotal, childhood         Family History:  Family History   Problem Relation Age of Onset    Diabetes Mother     Liver disease Father     Diabetes Family          Social History:  Social History     Socioeconomic History    Marital status: /Civil Union     Spouse name: None    Number of children: None    Years of education: None    Highest education level: None   Occupational History    None   Tobacco Use    Smoking status: Former     Types: Cigarettes    Smokeless tobacco: None   Vaping Use     Vaping status: Never Used   Substance and Sexual Activity    Alcohol use: Not Currently     Comment: occasional    Drug use: Never    Sexual activity: Yes     Partners: Female   Other Topics Concern    None   Social History Narrative    None     Social Determinants of Health     Financial Resource Strain: Not on file   Food Insecurity: Not on file   Transportation Needs: Not on file   Physical Activity: Not on file   Stress: Not on file   Social Connections: Not on file   Intimate Partner Violence: Not on file   Housing Stability: Not on file         Active Problems:  Patient Active Problem List   Diagnosis    Inadequately controlled diabetes mellitus (HCC)    Mixed hyperlipidemia    Hypertension    Diabetic polyneuropathy associated with type 2 diabetes mellitus (HCC)    Lower extremity edema         The following portions of the patient's history were reviewed and updated as appropriate: past medical history, past surgical history, past family history,  past social history, current medications, allergies and problem list.      Review of Systems:  Constitutional: Denies fever, chills  Eyes: Denies eye redness, eye discharge  ENT: Denies hearing loss, sneezing, nasal discharge, sore throat   Respiratory: Denies cough, expectoration, shortness of breath  Cardiovascular: +chest pain  Gastrointestinal: Denies abdominal pain, nausea, vomiting, diarrhea  Genito-Urinary: Denies dysuria, incontinence  Musculoskeletal: +back pain  Neurologic: Denies lightheadedness, syncope, headache, seizures  Endocrine: Denies polydipsia, temperature intolerance  Allergy and Immunology: Denies hives, insect bite sensitivity  Hematological and Lymphatic: Denies bleeding problems, swollen glands   Psychological: Denies depression, suicidal ideation, anxiety, panic  Dermatological: Denies pruritus, rash, skin lesion changes      Vitals:  Vitals:    02/05/24 0835   BP: 138/80   Pulse: 62   Resp: 16   SpO2: 98%       Body mass index is 46.97  "kg/m².    Weight (last 2 days)       Date/Time Weight    02/05/24 [phone] (526)              Physical Examination:  General: Patient is not in acute distress. Awake, alert, oriented in time, place and person. Responding to commands.  Morbidly obese  Head: Normocephalic. Atraumatic  Eyes: Both pupils normal sized, round and reactive to light. Nonicteric  ENT: Normal external ear canals  Neck: Supple. JVP not raised. Trachea central. No thyromegaly  Lungs: Bilateral bronchovascular breath sounds with no crackles or rhonchi  Chest wall: No tenderness  Cardiovascular: RRR. S1 and S2 normal. No murmur, rub or gallop  Gastrointestinal: Abdomen soft, nontender. No guarding or rigidity. Liver and spleen not palpable. Bowel sounds present  Neurologic: Patient is awake, alert, oriented in time, place and person. Responding to commands. Moving all extremities  Integumentary:  No skin rash  Lymphatic: No cervical lymphadenopathy  Back: Symmetric. No CVA tenderness  Extremities: No clubbing, cyanosis or edema      Laboratory Results:  CBC with diff:   Lab Results   Component Value Date    WBC 7.94 01/30/2024    RBC 4.49 01/30/2024    HGB 13.6 01/30/2024    HCT 40.8 01/30/2024    MCV 91 01/30/2024    MCH 30.3 01/30/2024    RDW 13.8 01/30/2024     01/30/2024       CMP:  Lab Results   Component Value Date    CREATININE 1.05 01/30/2024    BUN 30 (H) 01/30/2024    K 4.7 01/30/2024     01/30/2024    CO2 25 01/30/2024    ALKPHOS 45 01/30/2024    ALT 20 01/30/2024    AST 16 01/30/2024    BILIDIR 0.08 01/30/2024       Lab Results   Component Value Date    HGBA1C 6.5 (H) 01/30/2024    MG 1.7 (L) 01/20/2024       Lab Results   Component Value Date    CKTOTAL 60 03/29/2022       Lipid Profile:   No results found for: \"CHOL\"  Lab Results   Component Value Date    HDL 43 01/30/2024    HDL 48 08/21/2023    HDL 45 12/12/2022     Lab Results   Component Value Date    LDLCALC 50 01/30/2024    LDLCALC 53 08/21/2023    LDLCALC 39 " 12/12/2022     Lab Results   Component Value Date    TRIG 181 (H) 01/30/2024    TRIG 156 (H) 08/21/2023    TRIG 154 (H) 12/12/2022       EKG: Recent EKG performed in the ED reviewed      Medications:    Current Outpatient Medications:     BD Pen Needle Maria Eugenia 2nd Gen 32G X 4 MM MISC, USE TO INJECT              SUBCUTANEOUSLY EVERY       MORNING., Disp: 90 each, Rfl: 1    celecoxib (CeleBREX) 200 mg capsule, , Disp: , Rfl:     cetirizine (ZyrTEC) 10 mg tablet, Take 10 mg by mouth daily, Disp: , Rfl:     dulaglutide (Trulicity) 4.5 MG/0.5ML injection, Inject 4.5 mg under the skin every 7 days, Disp: , Rfl:     glipiZIDE (GLUCOTROL) 5 mg tablet, TAKE 1 AND 1/2 TABLETS     DAILY (= 7.5 MG DAILY), Disp: 135 tablet, Rfl: 3    lisinopril (ZESTRIL) 30 mg tablet, Take 1 tablet (30 mg total) by mouth 2 (two) times a day, Disp: 180 tablet, Rfl: 3    LORazepam (ATIVAN) 2 mg tablet, Take 2 mg by mouth 2 (two) times a day, Disp: , Rfl:     metFORMIN (GLUCOPHAGE) 1000 MG tablet, Take 1 tablet (1,000 mg total) by mouth 2 (two) times a day with meals, Disp: 180 tablet, Rfl: 3    PARoxetine (PAXIL) 20 mg tablet, Take 20 mg by mouth daily, Disp: , Rfl:     rosuvastatin (CRESTOR) 10 MG tablet, Take 1 tablet (10 mg total) by mouth daily, Disp: 90 tablet, Rfl: 4    tadalafil (CIALIS) 5 MG tablet, , Disp: , Rfl:     Toujeo SoloStar 300 units/mL CONCENTRATED U-300 injection pen (1-unit dial), Inject 30 Units under the skin daily, Disp: 9 mL, Rfl: 3    aspirin 81 mg chewable tablet, Chew 1 tablet (81 mg total) daily, Disp: , Rfl:       Allergies:  No Known Allergies      Assessment and Plan:  1. Chest pain, unspecified type  Recent EKG reviewed.  2D echocardiogram ordered for EF, RWMA, r/o HHD.  Pharmacological nuclear stress test ordered for evaluation of his concerning symptoms in view of multiple cardiac risk factors.  Because of his morbid obesity and back pain he would not be able to walk on the treadmill he states  - Echo complete w/  contrast if indicated; Future  - aspirin 81 mg chewable tablet; Chew 1 tablet (81 mg total) daily  - NM myocardial perfusion spect (rx stress and/or rest); Future    2. Essential hypertension  BP stable.  Continue current medications.  Continue to monitor BP at home and call if abnormal    3. Mixed hyperlipidemia  Continue statin and diet control.  His PCP closely monitors his blood work    4. Morbid obesity due to excess calories (HCC)  Try to lose weight    Recommend aggressive risk factor modification and therapeutic lifestyle changes.  Low-salt, low-calorie, low-fat, low-cholesterol diet and to optimize weight.  I will defer the ordering and monitoring of necessity lab studies to you, but I am available and happy to review and manage any of the data at your request in the future.    Discussed concepts of atherosclerosis, including signs and symptoms of cardiac disease.    Previous studies were reviewed.    Safety measures were reviewed.  Questions were entertained and answered.  Patient was advised to report any problems requiring medical attention.    Follow-up with PCP and appropriate specialist and lab work as discussed.    Return for follow up visit as scheduled or earlier, if needed.  Thank you for allowing me to participate in the care and evaluation of your patient.  Should you have any questions, please feel free to contact me.      Vinita Champion MD  2/5/2024,9:09 AM

## 2024-02-07 ENCOUNTER — OFFICE VISIT (OUTPATIENT)
Dept: ENDOCRINOLOGY | Facility: CLINIC | Age: 59
End: 2024-02-07
Payer: COMMERCIAL

## 2024-02-07 VITALS
DIASTOLIC BLOOD PRESSURE: 84 MMHG | WEIGHT: 315 LBS | HEART RATE: 72 BPM | SYSTOLIC BLOOD PRESSURE: 172 MMHG | OXYGEN SATURATION: 97 % | BODY MASS INDEX: 46.44 KG/M2

## 2024-02-07 DIAGNOSIS — E11.40 TYPE 2 DIABETES MELLITUS WITH DIABETIC NEUROPATHY, WITH LONG-TERM CURRENT USE OF INSULIN (HCC): Primary | ICD-10-CM

## 2024-02-07 DIAGNOSIS — I10 PRIMARY HYPERTENSION: ICD-10-CM

## 2024-02-07 DIAGNOSIS — Z79.4 TYPE 2 DIABETES MELLITUS WITH DIABETIC NEUROPATHY, WITH LONG-TERM CURRENT USE OF INSULIN (HCC): Primary | ICD-10-CM

## 2024-02-07 DIAGNOSIS — I10 HYPERTENSION, UNSPECIFIED TYPE: ICD-10-CM

## 2024-02-07 DIAGNOSIS — E78.2 MIXED HYPERLIPIDEMIA: ICD-10-CM

## 2024-02-07 PROCEDURE — 99214 OFFICE O/P EST MOD 30 MIN: CPT | Performed by: PHYSICIAN ASSISTANT

## 2024-02-07 PROCEDURE — 95251 CONT GLUC MNTR ANALYSIS I&R: CPT | Performed by: PHYSICIAN ASSISTANT

## 2024-02-07 RX ORDER — HYDROCHLOROTHIAZIDE 12.5 MG/1
12.5 CAPSULE, GELATIN COATED ORAL DAILY
Qty: 90 CAPSULE | Refills: 3 | Status: SHIPPED | OUTPATIENT
Start: 2024-02-07

## 2024-02-07 RX ORDER — ROSUVASTATIN CALCIUM 10 MG/1
10 TABLET, COATED ORAL DAILY
Qty: 90 TABLET | Refills: 3 | Status: SHIPPED | OUTPATIENT
Start: 2024-02-07

## 2024-02-07 RX ORDER — INSULIN GLARGINE 300 U/ML
26 INJECTION, SOLUTION SUBCUTANEOUS DAILY
Qty: 9 ML | Refills: 3 | Status: SHIPPED | OUTPATIENT
Start: 2024-02-07 | End: 2024-05-07

## 2024-02-07 RX ORDER — GLIPIZIDE 5 MG/1
TABLET ORAL
Qty: 135 TABLET | Refills: 3 | Status: SHIPPED | OUTPATIENT
Start: 2024-02-07

## 2024-02-07 RX ORDER — PEN NEEDLE, DIABETIC 32GX 5/32"
NEEDLE, DISPOSABLE MISCELLANEOUS DAILY
Qty: 90 EACH | Refills: 1 | Status: SHIPPED | OUTPATIENT
Start: 2024-02-07

## 2024-02-07 RX ORDER — ACYCLOVIR 400 MG/1
1 TABLET ORAL
Qty: 9 EACH | Refills: 3 | Status: SHIPPED | OUTPATIENT
Start: 2024-02-07

## 2024-02-07 RX ORDER — LISINOPRIL 40 MG/1
40 TABLET ORAL DAILY
Qty: 90 TABLET | Refills: 3 | Status: SHIPPED | OUTPATIENT
Start: 2024-02-07 | End: 2024-05-07

## 2024-02-07 NOTE — PROGRESS NOTES
Mike Caldewll 58 y.o. male MRN: [id number]    Encounter: [id number]      Assessment/Plan   Problem List Items Addressed This Visit          Endocrine    Type 2 diabetes mellitus with diabetic neuropathy, with long-term current use of insulin (formerly [facility]) - Primary       Lab Results   Component Value Date    HGBA1C 6.5 (H) 01/30/2024   Glycemic control continues to improve, and A1c now at goal.   Recent labs reviewed in detail.   BG monitoring - continue Dexcom G7 CGM - continues to benefit in setting of multiple daily doses of insulin.   Pharmacotherapy discussion -discussed probable superior weight loss benefits with Mounjaro as compared to Trulicity.  Also suspect glycemic control may further improve on Mounjaro. Will STOP Trulicity in exchange for Mounjaro 7.5mg weekly dose. May up-titrate to higher dosing in future pending experience.  Mike called insurance to inquire about coverage - appears covered, which is reassuring. To notify us if any supply issues, cost concerns. Continue other medications as prescribed, including glipizide, metformin, Toujeo.   Microalbuminuria noted with signs of mild CKD, stage 2 on labs. On Chart review it appears he was unable to tolerate Jardiance in past.  On ACE-I.  Cautioned with substitution of medications sometimes it can be difficult to predict BG response. He will let us know if any concerning BG trends arise - lows or highs.   Return to office in 3 to 4 months with pre-visit labs to include BMP, A1c.           Relevant Medications    Toujeo SoloStar 300 units/mL CONCENTRATED U-300 injection pen (1-unit dial)    BD Pen Needle Maria Eugenia 2nd Gen 32G X 4 MM MISC    glipiZIDE (GLUCOTROL) 5 mg tablet    tirzepatide (Mounjaro) 7.5 MG/0.5ML    metFORMIN (GLUCOPHAGE) 1000 MG tablet    Continuous Blood Gluc Sensor (Dexcom G7 Sensor)    Other Relevant Orders    Hemoglobin A1C    Basic metabolic panel       Cardiovascular and Mediastinum    Hypertension     Uncontrolled today, with review  of historic BP trends also not ideal.  On ACE-I above typical max dose. Recommend trial of lisinopril 40mg daily in combination with HCTZ 12.5mg daily.   Anticipate desired weight loss may also help with BP goals.         Relevant Medications    lisinopril (ZESTRIL) 40 mg tablet    hydroCHLOROthiazide 12.5 mg capsule       Other    Mixed hyperlipidemia     LDL at goal, but triglycerides slightly above target.  Continue statin.          Relevant Medications    rosuvastatin (CRESTOR) 10 MG tablet        CC: Diabetes    History of Present Illness     HPI:  Mike is a pleasant 58-year-old male with type 2 diabetes in the setting of obesity, here for routine follow-up. His diabetes is complicated by mild peripheral neuropathy.    He was last seen by endocrinology in August 2023.  At that time, his diabetes course was stable.  He denies any recent hospitalizations or major changes to his medical history, but did recently have an ER visit and cardiology visit recently for chest pain workup.    Mike mentions concern with continuing Trulicity - he is seeking more weight loss benefits, but not experiencing this with Trulicity, even at maximum dose for the past several months. He is inquiring about, and possibly interested in trying Ozempic instead.    BG monitoring is via Dexcom G7 CGM.    See below for summary report, see media section for full report.    Current DM medication review:  Trulicity 4.5 mg weekly  Glipizide 2.5mg in AM, and 5mg at night  Metformin 1000 mg twice daily.   Toujeo 30 units daily    For hypertension, he takes lisinopril.  For hyperlipidemia, he takes rosuvastatin.    DM eye exams - up to date.  DM foot exams - up to date, sees podiatry.      CGM summary report:  Mike Caldwell   Device used: Dexcom G7  Home use   Indication: Type 2 Diabetes  More than 72 hours of data was reviewed. Report to be scanned to chart.   Date Range: January 25 - February 7, 2024.    Analysis of data:   % time CGM  used: 93%  Average Glucose: 148 mg/dL  GMI: 6.8%  SD : 45 mg/dL  Time in Target Range: 79% in range  Time Above Range: 17% high, 3% very high  Time Below Range: Less than 1% low, less than 1% very low    Interpretation of data: Mike demonstrates very good glycemic control, within target range 79% of the time.  When hyperglycemia does occur, it typically occurs after dinnertime.  Hypoglycemia is very rare, appears recently recorded hypoglycemia episode was likely false.        Review of Systems   Constitutional:  Negative for chills and fever.   HENT:  Negative for ear pain and sore throat.    Eyes:  Negative for pain and visual disturbance.   Respiratory:  Negative for cough and shortness of breath.    Cardiovascular:  Negative for chest pain and palpitations.   Gastrointestinal:  Negative for abdominal pain and vomiting.   Endocrine: Negative for polydipsia and polyuria.   Genitourinary:  Negative for dysuria and hematuria.   Musculoskeletal:  Negative for arthralgias and back pain.   Skin:  Negative for color change and rash.   Neurological:  Negative for seizures and syncope.   All other systems reviewed and are negative.        Historical Information   Past Medical History:   Diagnosis Date    Anxiety     Diabetes mellitus (HCC)     Diabetic eye exam (HCC) 09/2021    Dyslipidemia     ED (erectile dysfunction)     Hypertension      Past Surgical History:   Procedure Laterality Date    HERNIA REPAIR      scotal, childhood     Social History   Social History     Substance and Sexual Activity   Alcohol Use Not Currently    Comment: occasional     Social History     Substance and Sexual Activity   Drug Use Never     Social History     Tobacco Use   Smoking Status Former    Types: Cigarettes   Smokeless Tobacco Not on file     Family History:   Family History   Problem Relation Age of Onset    Diabetes Mother     Liver disease Father     Diabetes Family        Meds/Allergies   Current Outpatient Medications    Medication Sig Dispense Refill    aspirin 81 mg chewable tablet Chew 1 tablet (81 mg total) daily      BD Pen Needle Maria Eugenia 2nd Gen 32G X 4 MM MISC USE TO INJECT              SUBCUTANEOUSLY EVERY       MORNING. 90 each 1    celecoxib (CeleBREX) 200 mg capsule       cetirizine (ZyrTEC) 10 mg tablet Take 10 mg by mouth daily      dulaglutide (Trulicity) 4.5 MG/0.5ML injection Inject 4.5 mg under the skin every 7 days      glipiZIDE (GLUCOTROL) 5 mg tablet TAKE 1 AND 1/2 TABLETS     DAILY (= 7.5 MG DAILY) 135 tablet 3    lisinopril (ZESTRIL) 30 mg tablet Take 1 tablet (30 mg total) by mouth 2 (two) times a day 180 tablet 3    LORazepam (ATIVAN) 2 mg tablet Take 2 mg by mouth 2 (two) times a day      metFORMIN (GLUCOPHAGE) 1000 MG tablet Take 1 tablet (1,000 mg total) by mouth 2 (two) times a day with meals 180 tablet 3    PARoxetine (PAXIL) 20 mg tablet Take 20 mg by mouth daily      rosuvastatin (CRESTOR) 10 MG tablet Take 1 tablet (10 mg total) by mouth daily 90 tablet 4    tadalafil (CIALIS) 5 MG tablet       Eric SoloStar 300 units/mL CONCENTRATED U-300 injection pen (1-unit dial) Inject 30 Units under the skin daily 9 mL 3     No current facility-administered medications for this visit.     No Known Allergies    Objective   Vitals: Blood pressure (!) 172/84, pulse 72, weight (!) 160 kg (352 lb), SpO2 97%.    Physical Exam  Vitals reviewed.   Constitutional:       General: He is not in acute distress.     Appearance: He is obese. He is not ill-appearing.   HENT:      Head: Normocephalic.      Mouth/Throat:      Mouth: Mucous membranes are moist.   Cardiovascular:      Rate and Rhythm: Normal rate and regular rhythm.   Pulmonary:      Effort: Pulmonary effort is normal.      Breath sounds: Normal breath sounds.   Musculoskeletal:      Right lower leg: Edema (trace) present.      Left lower leg: Edema (trace) present.   Skin:     General: Skin is warm and dry.   Neurological:      Mental Status: He is alert.    Psychiatric:         Mood and Affect: Mood normal.         The history was obtained from the review of the chart, patient.    Lab Results:   Component      Latest Ref Rng 1/30/2024   Sodium      135 - 147 mmol/L 135    Potassium      3.5 - 5.3 mmol/L 4.7    Chloride      96 - 108 mmol/L 104    Carbon Dioxide      21 - 32 mmol/L 25    ANION GAP      mmol/L 6    BUN      5 - 25 mg/dL 30 (H)    Creatinine      0.60 - 1.30 mg/dL 1.05    GLUCOSE, FASTING      65 - 99 mg/dL 117 (H)    Calcium      8.4 - 10.2 mg/dL 9.6    GFR, Calculated      ml/min/1.73sq m 77    WBC      4.31 - 10.16 Thousand/uL 7.94    RBC      3.88 - 5.62 Million/uL 4.49    Hemoglobin      12.0 - 17.0 g/dL 13.6    Hematocrit      36.5 - 49.3 % 40.8    MCV      82 - 98 fL 91    MCH      26.8 - 34.3 pg 30.3    MCHC      31.4 - 37.4 g/dL 33.3    RDW      11.6 - 15.1 % 13.8    Platelet Count      149 - 390 Thousands/uL 170    MPV      8.9 - 12.7 fL 10.6    Total Bilirubin      0.20 - 1.00 mg/dL 0.45    BILIRUBIN DIRECT      0.00 - 0.20 mg/dL 0.08    ALK PHOS      34 - 104 U/L 45    AST      13 - 39 U/L 16    ALT      7 - 52 U/L 20    Total Protein      6.4 - 8.4 g/dL 7.5    Albumin      3.5 - 5.0 g/dL 4.4    Cholesterol      See Comment mg/dL 129    Triglycerides      See Comment mg/dL 181 (H)    HDL      >=40 mg/dL 43    LDL Calculated      0 - 100 mg/dL 50    Non-HDL Cholesterol      mg/dl 86    Iron Saturation      15 - 50 % 16    TIBC      250 - 450 ug/dL 359    Iron      50 - 212 ug/dL 58    UIBC      155 - 355 ug/dL 301    EXT Creatinine Urine      Reference range not established. mg/dL 99.6    Albumin,U,Random      <20.0 mg/L 48.3 (H)    Albumin Creat Ratio      0 - 30 mg/g creatinine 48 (H)    TSH 3RD GENERATON      0.450 - 4.500 uIU/mL 2.406       Legend:  (H) High    Component      Latest Ref Rng 4/18/2023 8/21/2023 1/30/2024   Hemoglobin A1C      Normal 4.0-5.6%; PreDiabetic 5.7-6.4%; Diabetic >=6.5%; Glycemic control for adults with diabetes  "<7.0% % 6.4 (H)  6.7 (H)  6.5 (H)    eAG, EST AVG Glucose      mg/dl 137  146  140       Legend:  (H) High    Imaging Studies: n/a    Portions of the record may have been created with voice recognition software. Occasional wrong word or \"sound a like\" substitutions may have occurred due to the inherent limitations of voice recognition software. Read the chart carefully and recognize, using context, where substitutions have occurred.    "

## 2024-02-08 NOTE — ASSESSMENT & PLAN NOTE
Lab Results   Component Value Date    HGBA1C 6.5 (H) 01/30/2024   Glycemic control continues to improve, and A1c now at goal.   Recent labs reviewed in detail.   BG monitoring - continue Dexcom G7 CGM - continues to benefit in setting of multiple daily doses of insulin.   Pharmacotherapy discussion -discussed probable superior weight loss benefits with Mounjaro as compared to Trulicity.  Also suspect glycemic control may further improve on Mounjaro. Will STOP Trulicity in exchange for Mounjaro 7.5mg weekly dose. May up-titrate to higher dosing in future pending experience.  Mike called insurance to inquire about coverage - appears covered, which is reassuring. To notify us if any supply issues, cost concerns. Continue other medications as prescribed, including glipizide, metformin, Toujeo.   Microalbuminuria noted with signs of mild CKD, stage 2 on labs. On Chart review it appears he was unable to tolerate Jardiance in past.  On ACE-I.  Cautioned with substitution of medications sometimes it can be difficult to predict BG response. He will let us know if any concerning BG trends arise - lows or highs.   Return to office in 3 to 4 months with pre-visit labs to include BMP, A1c.

## 2024-04-30 ENCOUNTER — HOSPITAL ENCOUNTER (OUTPATIENT)
Dept: NON INVASIVE DIAGNOSTICS | Facility: CLINIC | Age: 59
Discharge: HOME | End: 2024-04-30
Payer: COMMERCIAL

## 2024-04-30 VITALS
OXYGEN SATURATION: 97 % | HEIGHT: 73 IN | BODY MASS INDEX: 41.75 KG/M2 | DIASTOLIC BLOOD PRESSURE: 96 MMHG | WEIGHT: 315 LBS | HEART RATE: 80 BPM | SYSTOLIC BLOOD PRESSURE: 168 MMHG

## 2024-04-30 VITALS
BODY MASS INDEX: 41.75 KG/M2 | WEIGHT: 315 LBS | HEART RATE: 91 BPM | SYSTOLIC BLOOD PRESSURE: 142 MMHG | HEIGHT: 73 IN | DIASTOLIC BLOOD PRESSURE: 98 MMHG

## 2024-04-30 DIAGNOSIS — R07.9 CHEST PAIN, UNSPECIFIED TYPE: ICD-10-CM

## 2024-04-30 LAB
AORTIC ROOT: 3.3 CM
APICAL FOUR CHAMBER EJECTION FRACTION: 64 %
AV LVOT PEAK GRADIENT: 6 MMHG
AV PEAK GRADIENT: 11 MMHG
BSA FOR ECHO PROCEDURE: 2.74 M2
CHEST PAIN STATEMENT: NORMAL
E WAVE DECELERATION TIME: 235 MS
E/A RATIO: 0.97
FRACTIONAL SHORTENING: 30 (ref 28–44)
INTERVENTRICULAR SEPTUM IN DIASTOLE (PARASTERNAL SHORT AXIS VIEW): 1.3 CM
INTERVENTRICULAR SEPTUM: 1.3 CM (ref 0.6–1.1)
LAAS-AP2: 16.9 CM2
LAAS-AP4: 14.8 CM2
LEFT ATRIUM AREA SYSTOLE SINGLE PLANE A4C: 14.8 CM2
LEFT ATRIUM SIZE: 3.5 CM
LEFT ATRIUM VOLUME (MOD BIPLANE): 44 ML
LEFT ATRIUM VOLUME INDEX (MOD BIPLANE): 16.1 ML/M2
LEFT INTERNAL DIMENSION IN SYSTOLE: 3.3 CM (ref 2.1–4)
LEFT VENTRICULAR INTERNAL DIMENSION IN DIASTOLE: 4.7 CM (ref 3.5–6)
LEFT VENTRICULAR POSTERIOR WALL IN END DIASTOLE: 1.3 CM
LEFT VENTRICULAR STROKE VOLUME: 60 ML
LVSV (TEICH): 60 ML
MAX DIASTOLIC BP: 68 MMHG
MAX PREDICTED HEART RATE: 162 BPM
MV E'TISSUE VEL-SEP: 9 CM/S
MV PEAK A VEL: 0.86 M/S
MV PEAK E VEL: 83 CM/S
MV STENOSIS PRESSURE HALF TIME: 68 MS
MV VALVE AREA P 1/2 METHOD: 3.24
NUC STRESS DIASTOLIC VOLUME INDEX: 154 ML/M2
NUC STRESS EJECTION FRACTION: 52 %
NUC STRESS SYSTOLIC VOLUME INDEX: 74 ML/M2
PROTOCOL NAME: NORMAL
RATE PRESSURE PRODUCT: NORMAL
REASON FOR TERMINATION: NORMAL
RIGHT ATRIUM AREA SYSTOLE A4C: 11.5 CM2
RIGHT VENTRICLE ID DIMENSION: 3 CM
SL CV LEFT ATRIUM LENGTH A2C: 4.9 CM
SL CV LV EF: 55
SL CV PED ECHO LEFT VENTRICLE DIASTOLIC VOLUME (MOD BIPLANE) 2D: 104 ML
SL CV PED ECHO LEFT VENTRICLE SYSTOLIC VOLUME (MOD BIPLANE) 2D: 44 ML
SL CV REST NUCLEAR ISOTOPE DOSE: 10.93 MCI
SL CV STRESS NUCLEAR ISOTOPE DOSE: 48.8 MCI
SL CV STRESS RECOVERY BP: NORMAL MMHG
SL CV STRESS RECOVERY HR: 99 BPM
SL CV STRESS RECOVERY O2 SAT: 98 %
STRESS ANGINA INDEX: 0
STRESS BASELINE BP: NORMAL MMHG
STRESS BASELINE HR: 80 BPM
STRESS O2 SAT REST: 97 %
STRESS PEAK HR: 127 BPM
STRESS POST EXERCISE DUR MIN: 3 MIN
STRESS POST EXERCISE DUR SEC: 0 SEC
STRESS POST O2 SAT PEAK: 97 %
STRESS POST PEAK BP: 226 MMHG
STRESS POST PEAK HR: 127 BPM
STRESS POST PEAK SYSTOLIC BP: 226 MMHG
STRESS/REST PERFUSION RATIO: 0.92
TARGET HR FORMULA: NORMAL
TEST INDICATION: NORMAL
TRICUSPID ANNULAR PLANE SYSTOLIC EXCURSION: 2.2 CM

## 2024-04-30 PROCEDURE — 78452 HT MUSCLE IMAGE SPECT MULT: CPT

## 2024-04-30 PROCEDURE — 93306 TTE W/DOPPLER COMPLETE: CPT | Performed by: INTERNAL MEDICINE

## 2024-04-30 PROCEDURE — 93016 CV STRESS TEST SUPVJ ONLY: CPT | Performed by: INTERNAL MEDICINE

## 2024-04-30 PROCEDURE — 93018 CV STRESS TEST I&R ONLY: CPT | Performed by: INTERNAL MEDICINE

## 2024-04-30 PROCEDURE — 93306 TTE W/DOPPLER COMPLETE: CPT

## 2024-04-30 PROCEDURE — A9502 TC99M TETROFOSMIN: HCPCS

## 2024-04-30 PROCEDURE — 78452 HT MUSCLE IMAGE SPECT MULT: CPT | Performed by: INTERNAL MEDICINE

## 2024-04-30 PROCEDURE — 93017 CV STRESS TEST TRACING ONLY: CPT

## 2024-04-30 RX ORDER — REGADENOSON 0.08 MG/ML
0.4 INJECTION, SOLUTION INTRAVENOUS ONCE
Status: COMPLETED | OUTPATIENT
Start: 2024-04-30 | End: 2024-04-30

## 2024-04-30 RX ADMIN — REGADENOSON 0.4 MG: 0.08 INJECTION, SOLUTION INTRAVENOUS at 11:40

## 2024-04-30 RX ADMIN — PERFLUTREN 0.6 ML/MIN: 6.52 INJECTION, SUSPENSION INTRAVENOUS at 12:28

## 2024-05-01 ENCOUNTER — TELEPHONE (OUTPATIENT)
Dept: CARDIOLOGY CLINIC | Facility: CLINIC | Age: 59
End: 2024-05-01

## 2024-05-01 NOTE — TELEPHONE ENCOUNTER
----- Message from Vinita Champion MD sent at 5/1/2024  3:42 PM EDT -----  Please call and inform the patient that the echo was overall normal.

## 2024-05-01 NOTE — TELEPHONE ENCOUNTER
Caller: Patient     Doctor: Dr. Champion     Reason for call: PT returned call & would like a csll back.     Call back#: [phone]

## 2024-05-09 NOTE — TELEPHONE ENCOUNTER
Called patient, discussed results of stress test. Explained that results of fixed defect could be false positive vs old infarct, but imaging shows no evidence of ischemia or blockage at this time. Patient was advised to reach out to his PCP for further investigation of alternative etiologies of chest discomfort. He states the chest discomfort has resolved. He was advised to continue monitoring his symptoms and reach out to our office for recurrence or worsening of chest pain or for new symptoms such as SOB. All questions answered.

## 2024-05-17 ENCOUNTER — TELEPHONE (OUTPATIENT)
Dept: ENDOCRINOLOGY | Facility: CLINIC | Age: 59
End: 2024-05-17

## 2024-05-17 DIAGNOSIS — Z79.4 TYPE 2 DIABETES MELLITUS WITH DIABETIC NEUROPATHY, WITH LONG-TERM CURRENT USE OF INSULIN (HCC): Primary | ICD-10-CM

## 2024-05-17 DIAGNOSIS — E11.40 TYPE 2 DIABETES MELLITUS WITH DIABETIC NEUROPATHY, WITH LONG-TERM CURRENT USE OF INSULIN (HCC): Primary | ICD-10-CM

## 2024-05-17 RX ORDER — ACYCLOVIR 400 MG/1
1 TABLET ORAL
Qty: 4 EACH | Refills: 0
Start: 2024-05-17

## 2024-05-17 RX ORDER — ACYCLOVIR 400 MG/1
1 TABLET ORAL AS NEEDED
Qty: 1 EACH | Refills: 0
Start: 2024-05-17

## 2024-05-17 NOTE — TELEPHONE ENCOUNTER
Patient of Suleiman Barakat PA-C.  Patient needs order for Dexcom /[place].  States he is now able to have one.  Please send to [facility].  Questions or concerns: [phone]

## 2024-06-20 ENCOUNTER — APPOINTMENT (OUTPATIENT)
Dept: LAB | Facility: HOSPITAL | Age: 59
End: 2024-06-20
Payer: COMMERCIAL

## 2024-06-20 DIAGNOSIS — Z79.4 TYPE 2 DIABETES MELLITUS WITH DIABETIC NEUROPATHY, WITH LONG-TERM CURRENT USE OF INSULIN (HCC): ICD-10-CM

## 2024-06-20 DIAGNOSIS — E11.40 TYPE 2 DIABETES MELLITUS WITH DIABETIC NEUROPATHY, WITH LONG-TERM CURRENT USE OF INSULIN (HCC): ICD-10-CM

## 2024-06-20 LAB
ANION GAP SERPL CALCULATED.3IONS-SCNC: 8 MMOL/L (ref 4–13)
BUN SERPL-MCNC: 27 MG/DL (ref 5–25)
CALCIUM SERPL-MCNC: 9.2 MG/DL (ref 8.4–10.2)
CHLORIDE SERPL-SCNC: 103 MMOL/L (ref 96–108)
CO2 SERPL-SCNC: 26 MMOL/L (ref 21–32)
CREAT SERPL-MCNC: 0.92 MG/DL (ref 0.6–1.3)
EST. AVERAGE GLUCOSE BLD GHB EST-MCNC: 146 MG/DL
GFR SERPL CREATININE-BSD FRML MDRD: 91 ML/MIN/1.73SQ M
GLUCOSE P FAST SERPL-MCNC: 109 MG/DL (ref 65–99)
HBA1C MFR BLD: 6.7 %
POTASSIUM SERPL-SCNC: 4.7 MMOL/L (ref 3.5–5.3)
SODIUM SERPL-SCNC: 137 MMOL/L (ref 135–147)

## 2024-06-20 PROCEDURE — 83036 HEMOGLOBIN GLYCOSYLATED A1C: CPT

## 2024-06-20 PROCEDURE — 80048 BASIC METABOLIC PNL TOTAL CA: CPT

## 2024-06-20 PROCEDURE — 36415 COLL VENOUS BLD VENIPUNCTURE: CPT

## 2024-06-27 ENCOUNTER — OFFICE VISIT (OUTPATIENT)
Dept: ENDOCRINOLOGY | Facility: CLINIC | Age: 59
End: 2024-06-27
Payer: COMMERCIAL

## 2024-06-27 VITALS
WEIGHT: 315 LBS | DIASTOLIC BLOOD PRESSURE: 84 MMHG | OXYGEN SATURATION: 97 % | SYSTOLIC BLOOD PRESSURE: 138 MMHG | BODY MASS INDEX: 41.75 KG/M2 | HEIGHT: 73 IN | HEART RATE: 96 BPM

## 2024-06-27 DIAGNOSIS — I10 HYPERTENSION, UNSPECIFIED TYPE: ICD-10-CM

## 2024-06-27 DIAGNOSIS — E11.40 TYPE 2 DIABETES MELLITUS WITH DIABETIC NEUROPATHY, WITH LONG-TERM CURRENT USE OF INSULIN (HCC): Primary | ICD-10-CM

## 2024-06-27 DIAGNOSIS — Z79.4 TYPE 2 DIABETES MELLITUS WITH DIABETIC NEUROPATHY, WITH LONG-TERM CURRENT USE OF INSULIN (HCC): Primary | ICD-10-CM

## 2024-06-27 DIAGNOSIS — E78.2 MIXED HYPERLIPIDEMIA: ICD-10-CM

## 2024-06-27 PROCEDURE — 95251 CONT GLUC MNTR ANALYSIS I&R: CPT | Performed by: INTERNAL MEDICINE

## 2024-06-27 PROCEDURE — 99214 OFFICE O/P EST MOD 30 MIN: CPT | Performed by: INTERNAL MEDICINE

## 2024-06-27 RX ORDER — ACYCLOVIR 400 MG/1
1 TABLET ORAL DAILY
Qty: 1 EACH | Refills: 0 | Status: SHIPPED | OUTPATIENT
Start: 2024-06-27

## 2024-06-27 RX ORDER — GLIPIZIDE 5 MG/1
TABLET ORAL
Start: 2024-06-27

## 2024-06-27 NOTE — PROGRESS NOTES
Mike Caldwell 58 y.o. male MRN: [id number]    Encounter: [id number]      Assessment & Plan     Assessment:  This is a 58 y.o.-year-old male with diabetes with hyperglycemia.    Plan:  Diagnoses and all orders for this visit:    Type 2 diabetes mellitus with diabetic neuropathy, with long-term current use of insulin ([facility])  Lab Results   Component Value Date    HGBA1C 6.7 (H) 06/20/2024   Most recent A1c 6.7%, well-controlled.  Patient would like to switch to Ozempic as Mounjaro is not available at his pharmacy.  Will switch to Ozempic 1 mg once a week injection.  Discussed the side effects and benefits.  Continue metformin 1000 mg twice a day, continue glipizide 5 mg half tablet in the morning and 1 tablet in the evening with food.  Continue Toujeo 26 units at bedtime.  Discussed with patient not to change the dose of Toujeo based on his blood sugars.  Educated about hypoglycemia symptoms and treatment  Discussed long-term complication of uncontrolled diabetes including risk of heart attack, stroke, neuropathy, nephropathy and retinopathy  -     semaglutide, 1 mg/dose, (Ozempic, 1 MG/DOSE,) 4 mg/3 mL injection pen; Inject 0.75 mL (1 mg total) under the skin every 7 days  -     metFORMIN (GLUCOPHAGE) 1000 MG tablet; Take 1 tablet (1,000 mg total) by mouth 2 (two) times a day with meals  -     glipiZIDE (GLUCOTROL) 5 mg tablet; Take 1/2 tablet in AM, 1 tablet in PM ( this medications needs to be taken with food)  -     Ambulatory Referral to Ophthalmology; Future  -     Continuous Glucose  (Dexcom G7 ) LEAH; Use 1 Units in the morning Use as directed  -     Albumin / creatinine urine ratio; Future  -     Hemoglobin A1C; Future  -     Comprehensive metabolic panel; Future    Mixed hyperlipidemia  Continue statins, goal for LDL is less than 70 mg per DL  Hypertension, unspecified type  Blood pressure is usually well-controlled, continue current management goal for blood pressure is less than 130/80  mmHg       CC: Diabetes    History of Present Illness     HPI:    Mike Caldwell is 58-year-old male with medical history of type 2 diabetes, obesity, peripheral neuropathy is here for follow-up.  He was last seen by Dr. Barry.  I am seeing him for the first time.  Diabetes course has been stable.  He monitors blood sugars regularly with Dexcom G7 sensor.    Current medications include Mounjaro 7.5 mg once weekly, glipizide 2.5 mg in the morning and 5 mg at night, metformin 1000 mg twice a day, Toujeo 30 units daily  He admits compliance to medications.  Denies side effects  Patient  stated that [facility] pharmacy does not have Mounjaro in stock and he would like to switch to Ozempic, as per patient Ozempic is covered by his insurance    He takes lisinopril, denies side effects, blood pressure well-controlled  Hyperlipidemia, he takes rosuvastatin,     BP Readings from Last 3 Encounters:   06/27/24 138/84   04/30/24 168/96   04/30/24 142/98        Lab Results   Component Value Date    LDLCALC 50 01/30/2024       Lab Results   Component Value Date    HGBA1C 6.7 (H) 06/20/2024       Wt Readings from Last 3 Encounters:   06/27/24 (!) 159 kg (350 lb 12.8 oz)   04/30/24 (!) 160 kg (352 lb)   04/30/24 (!) 160 kg (352 lb)        Lab Results   Component Value Date    LDLCALC 50 01/30/2024          Review of Systems   Constitutional:  Negative for activity change, diaphoresis, fatigue, fever and unexpected weight change.   HENT: Negative.     Eyes:  Negative for visual disturbance.   Respiratory:  Negative for cough, chest tightness and shortness of breath.    Cardiovascular:  Negative for chest pain, palpitations and leg swelling.   Gastrointestinal:  Negative for abdominal pain, blood in stool, constipation, diarrhea, nausea and vomiting.   Endocrine: Negative for cold intolerance, heat intolerance, polydipsia, polyphagia and polyuria.   Genitourinary:  Negative for dysuria, enuresis, frequency and urgency.    Musculoskeletal:  Negative for arthralgias and myalgias.   Skin:  Negative for pallor, rash and wound.   Allergic/Immunologic: Negative.    Neurological:  Negative for dizziness, tremors, weakness and numbness.   Hematological: Negative.    Psychiatric/Behavioral: Negative.         Historical Information   Past Medical History:   Diagnosis Date    Anxiety     Diabetes mellitus (HCC)     Diabetic eye exam (HCC) 09/2021    Dyslipidemia     ED (erectile dysfunction)     Hypertension      Past Surgical History:   Procedure Laterality Date    HERNIA REPAIR      scotal, childhood     Social History   Social History     Substance and Sexual Activity   Alcohol Use Yes    Comment: occasional     Social History     Substance and Sexual Activity   Drug Use Never     Social History     Tobacco Use   Smoking Status Former    Types: Cigarettes   Smokeless Tobacco Not on file     Family History:   Family History   Problem Relation Age of Onset    Diabetes Mother     Diabetes type II Mother     Liver disease Father     Diabetes Family        Meds/Allergies   Current Outpatient Medications   Medication Sig Dispense Refill    aspirin 81 mg chewable tablet Chew 1 tablet (81 mg total) daily      BD Pen Needle Maria Eugenia 2nd Gen 32G X 4 MM MISC Inject under the skin in the morning For use with insulin once daily 90 each 1    celecoxib (CeleBREX) 200 mg capsule       Continuous Blood Gluc Sensor (Dexcom G7 Sensor) Use 1 Device every 10 days 9 each 3    Continuous Glucose  (Dexcom G7 ) LEAH Use 1 each if needed (use as dierected) 1 each 0    Continuous Glucose  (Dexcom G7 ) LEAH Use 1 Units in the morning Use as directed 1 each 0    Continuous Glucose Sensor (Dexcom G7 Sensor) Use 1 Device every 10 days 4 each 0    glipiZIDE (GLUCOTROL) 5 mg tablet Take 1/2 tablet in AM, 1 tablet in PM ( this medications needs to be taken with food)      hydroCHLOROthiazide 12.5 mg capsule Take 1 capsule (12.5 mg total) by  "mouth daily 90 capsule 3    LORazepam (ATIVAN) 2 mg tablet Take 2 mg by mouth 2 (two) times a day      metFORMIN (GLUCOPHAGE) 1000 MG tablet Take 1 tablet (1,000 mg total) by mouth 2 (two) times a day with meals 180 tablet 0    PARoxetine (PAXIL) 20 mg tablet Take 20 mg by mouth daily      rosuvastatin (CRESTOR) 10 MG tablet Take 1 tablet (10 mg total) by mouth daily 90 tablet 3    semaglutide, 1 mg/dose, (Ozempic, 1 MG/DOSE,) 4 mg/3 mL injection pen Inject 0.75 mL (1 mg total) under the skin every 7 days 9 mL 1    tadalafil (CIALIS) 5 MG tablet       cetirizine (ZyrTEC) 10 mg tablet Take 10 mg by mouth daily (Patient not taking: Reported on 2/7/2024)      lisinopril (ZESTRIL) 40 mg tablet Take 1 tablet (40 mg total) by mouth daily 90 tablet 3    Toujeo SoloStar 300 units/mL CONCENTRATED U-300 injection pen (1-unit dial) Inject 26 Units under the skin daily Or as directed TDD up to 30 units daily 9 mL 3     No current facility-administered medications for this visit.     No Known Allergies    Objective   Vitals: Blood pressure 138/84, pulse 96, height 6' 1\" (1.854 m), weight (!) 159 kg (350 lb 12.8 oz), SpO2 97%.    Physical Exam  Vitals reviewed.   Constitutional:       General: He is not in acute distress.     Appearance: He is well-developed.   HENT:      Head: Normocephalic and atraumatic.   Eyes:      Pupils: Pupils are equal, round, and reactive to light.   Neck:      Thyroid: No thyromegaly.   Cardiovascular:      Rate and Rhythm: Normal rate and regular rhythm.      Heart sounds: Normal heart sounds.   Pulmonary:      Effort: Pulmonary effort is normal. No respiratory distress.      Breath sounds: Normal breath sounds.   Musculoskeletal:         General: No deformity. Normal range of motion.      Cervical back: Normal range of motion and neck supple.   Skin:     General: Skin is warm and dry.      Findings: No erythema.   Neurological:      Mental Status: He is alert and oriented to person, place, and time. " "        The history was obtained from the review of the chart, patient.    Lab Results:   Lab Results   Component Value Date/Time    Hemoglobin A1C 6.7 (H) 06/20/2024 07:09 AM    Hemoglobin A1C 6.5 (H) 01/30/2024 07:11 AM    Hemoglobin A1C 6.7 (H) 08/21/2023 07:24 AM    WBC 7.94 01/30/2024 07:11 AM    WBC 7.29 01/20/2024 10:07 AM    Hemoglobin 13.6 01/30/2024 07:11 AM    Hemoglobin 14.1 01/20/2024 10:07 AM    Hematocrit 40.8 01/30/2024 07:11 AM    Hematocrit 41.9 01/20/2024 10:07 AM    MCV 91 01/30/2024 07:11 AM    MCV 90 01/20/2024 10:07 AM    Platelets 170 01/30/2024 07:11 AM    Platelets 198 01/20/2024 10:07 AM    BUN 27 (H) 06/20/2024 07:09 AM    BUN 30 (H) 01/30/2024 07:11 AM    BUN 22 01/20/2024 10:07 AM    Potassium 4.7 06/20/2024 07:09 AM    Potassium 4.7 01/30/2024 07:11 AM    Potassium 4.7 01/20/2024 10:07 AM    Chloride 103 06/20/2024 07:09 AM    Chloride 104 01/30/2024 07:11 AM    Chloride 103 01/20/2024 10:07 AM    CO2 26 06/20/2024 07:09 AM    CO2 25 01/30/2024 07:11 AM    CO2 23 01/20/2024 10:07 AM    Creatinine 0.92 06/20/2024 07:09 AM    Creatinine 1.05 01/30/2024 07:11 AM    Creatinine 0.95 01/20/2024 10:07 AM    AST 16 01/30/2024 07:11 AM    AST 20 01/20/2024 10:07 AM    AST 22 08/21/2023 07:24 AM    ALT 20 01/30/2024 07:11 AM    ALT 25 01/20/2024 10:07 AM    ALT 25 08/21/2023 07:24 AM    Total Protein 7.5 01/30/2024 07:11 AM    Total Protein 7.7 01/20/2024 10:07 AM    Total Protein 7.5 08/21/2023 07:24 AM    Albumin 4.4 01/30/2024 07:11 AM    Albumin 4.5 01/20/2024 10:07 AM    Albumin 4.4 08/21/2023 07:24 AM    HDL, Direct 43 01/30/2024 07:11 AM    HDL, Direct 48 08/21/2023 07:24 AM    Triglycerides 181 (H) 01/30/2024 07:11 AM    Triglycerides 156 (H) 08/21/2023 07:24 AM           Imaging Studies: I have personally reviewed pertinent reports.      Portions of the record may have been created with voice recognition software. Occasional wrong word or \"sound a like\" substitutions may have occurred " due to the inherent limitations of voice recognition software. Read the chart carefully and recognize, using context, where substitutions have occurred.

## 2024-09-03 ENCOUNTER — APPOINTMENT (OUTPATIENT)
Dept: LAB | Facility: HOSPITAL | Age: 59
End: 2024-09-03
Payer: COMMERCIAL

## 2024-09-03 DIAGNOSIS — Z12.5 SPECIAL SCREENING FOR MALIGNANT NEOPLASM OF PROSTATE: ICD-10-CM

## 2024-09-03 LAB — PSA SERPL-MCNC: 1.05 NG/ML (ref 0–4)

## 2024-09-03 PROCEDURE — 36415 COLL VENOUS BLD VENIPUNCTURE: CPT

## 2024-09-03 PROCEDURE — G0103 PSA SCREENING: HCPCS

## 2024-09-18 DIAGNOSIS — Z79.4 TYPE 2 DIABETES MELLITUS WITH DIABETIC NEUROPATHY, WITH LONG-TERM CURRENT USE OF INSULIN (HCC): ICD-10-CM

## 2024-09-18 DIAGNOSIS — E11.40 TYPE 2 DIABETES MELLITUS WITH DIABETIC NEUROPATHY, WITH LONG-TERM CURRENT USE OF INSULIN (HCC): ICD-10-CM

## 2024-09-18 RX ORDER — PEN NEEDLE, DIABETIC 32GX 5/32"
NEEDLE, DISPOSABLE MISCELLANEOUS DAILY
Qty: 90 EACH | Refills: 1 | Status: SHIPPED | OUTPATIENT
Start: 2024-09-18

## 2024-09-18 NOTE — TELEPHONE ENCOUNTER
Reason for call:   [x] Refill   [] Prior Auth  [] Other:     Office:   [] PCP/Provider -   [x] Specialty/Provider - Endo     Medication: BD Maria Eugenia Pen Needles     Dose/Frequency: 32G 4MM daily    Quantity: 90 with refill     Pharmacy: [facility]     Does the patient have enough for 3 days?   [x] Yes   [] No - Send as HP to POD

## 2024-10-21 DIAGNOSIS — E11.40 TYPE 2 DIABETES MELLITUS WITH DIABETIC NEUROPATHY, WITH LONG-TERM CURRENT USE OF INSULIN (HCC): ICD-10-CM

## 2024-10-21 DIAGNOSIS — Z79.4 TYPE 2 DIABETES MELLITUS WITH DIABETIC NEUROPATHY, WITH LONG-TERM CURRENT USE OF INSULIN (HCC): ICD-10-CM

## 2024-11-20 ENCOUNTER — APPOINTMENT (OUTPATIENT)
Dept: LAB | Facility: HOSPITAL | Age: 59
End: 2024-11-20
Attending: INTERNAL MEDICINE
Payer: COMMERCIAL

## 2024-11-20 ENCOUNTER — RESULTS FOLLOW-UP (OUTPATIENT)
Dept: ENDOCRINOLOGY | Facility: CLINIC | Age: 59
End: 2024-11-20

## 2024-11-20 DIAGNOSIS — Z79.4 TYPE 2 DIABETES MELLITUS WITH DIABETIC NEUROPATHY, WITH LONG-TERM CURRENT USE OF INSULIN (HCC): ICD-10-CM

## 2024-11-20 DIAGNOSIS — E11.40 TYPE 2 DIABETES MELLITUS WITH DIABETIC NEUROPATHY, WITH LONG-TERM CURRENT USE OF INSULIN (HCC): ICD-10-CM

## 2024-11-20 LAB
ALBUMIN SERPL BCG-MCNC: 4.1 G/DL (ref 3.5–5)
ALP SERPL-CCNC: 49 U/L (ref 34–104)
ALT SERPL W P-5'-P-CCNC: 25 U/L (ref 7–52)
ANION GAP SERPL CALCULATED.3IONS-SCNC: 6 MMOL/L (ref 4–13)
AST SERPL W P-5'-P-CCNC: 22 U/L (ref 13–39)
BILIRUB SERPL-MCNC: 0.46 MG/DL (ref 0.2–1)
BUN SERPL-MCNC: 23 MG/DL (ref 5–25)
CALCIUM SERPL-MCNC: 9.4 MG/DL (ref 8.4–10.2)
CHLORIDE SERPL-SCNC: 104 MMOL/L (ref 96–108)
CO2 SERPL-SCNC: 26 MMOL/L (ref 21–32)
CREAT SERPL-MCNC: 0.95 MG/DL (ref 0.6–1.3)
CREAT UR-MCNC: 104.7 MG/DL
EST. AVERAGE GLUCOSE BLD GHB EST-MCNC: 166 MG/DL
GFR SERPL CREATININE-BSD FRML MDRD: 87 ML/MIN/1.73SQ M
GLUCOSE P FAST SERPL-MCNC: 102 MG/DL (ref 65–99)
HBA1C MFR BLD: 7.4 %
MICROALBUMIN UR-MCNC: 40.8 MG/L
MICROALBUMIN/CREAT 24H UR: 39 MG/G CREATININE (ref 0–30)
POTASSIUM SERPL-SCNC: 4.8 MMOL/L (ref 3.5–5.3)
PROT SERPL-MCNC: 7.1 G/DL (ref 6.4–8.4)
SODIUM SERPL-SCNC: 136 MMOL/L (ref 135–147)

## 2024-11-20 PROCEDURE — 82043 UR ALBUMIN QUANTITATIVE: CPT

## 2024-11-20 PROCEDURE — 83036 HEMOGLOBIN GLYCOSYLATED A1C: CPT

## 2024-11-20 PROCEDURE — 36415 COLL VENOUS BLD VENIPUNCTURE: CPT

## 2024-11-20 PROCEDURE — 82570 ASSAY OF URINE CREATININE: CPT

## 2024-11-20 PROCEDURE — 80053 COMPREHEN METABOLIC PANEL: CPT

## 2024-11-27 ENCOUNTER — TELEPHONE (OUTPATIENT)
Age: 59
End: 2024-11-27

## 2024-11-27 ENCOUNTER — OFFICE VISIT (OUTPATIENT)
Dept: ENDOCRINOLOGY | Facility: CLINIC | Age: 59
End: 2024-11-27
Payer: COMMERCIAL

## 2024-11-27 VITALS
BODY MASS INDEX: 41.75 KG/M2 | DIASTOLIC BLOOD PRESSURE: 102 MMHG | WEIGHT: 315 LBS | OXYGEN SATURATION: 97 % | HEIGHT: 73 IN | SYSTOLIC BLOOD PRESSURE: 162 MMHG | HEART RATE: 74 BPM

## 2024-11-27 DIAGNOSIS — E78.2 MIXED HYPERLIPIDEMIA: ICD-10-CM

## 2024-11-27 DIAGNOSIS — E11.40 TYPE 2 DIABETES MELLITUS WITH DIABETIC NEUROPATHY, WITH LONG-TERM CURRENT USE OF INSULIN (HCC): Primary | ICD-10-CM

## 2024-11-27 DIAGNOSIS — I10 PRIMARY HYPERTENSION: ICD-10-CM

## 2024-11-27 DIAGNOSIS — Z79.4 TYPE 2 DIABETES MELLITUS WITH DIABETIC NEUROPATHY, WITH LONG-TERM CURRENT USE OF INSULIN (HCC): Primary | ICD-10-CM

## 2024-11-27 PROCEDURE — 99214 OFFICE O/P EST MOD 30 MIN: CPT | Performed by: PHYSICIAN ASSISTANT

## 2024-11-27 PROCEDURE — 95251 CONT GLUC MNTR ANALYSIS I&R: CPT | Performed by: PHYSICIAN ASSISTANT

## 2024-11-27 RX ORDER — HYDROCHLOROTHIAZIDE 12.5 MG/1
12.5 CAPSULE ORAL DAILY
Qty: 90 CAPSULE | Refills: 1 | Status: SHIPPED | OUTPATIENT
Start: 2024-11-27

## 2024-11-27 RX ORDER — LISINOPRIL 40 MG/1
40 TABLET ORAL DAILY
Qty: 90 TABLET | Refills: 1 | Status: SHIPPED | OUTPATIENT
Start: 2024-11-27 | End: 2025-02-25

## 2024-11-27 RX ORDER — ROSUVASTATIN CALCIUM 10 MG/1
10 TABLET, COATED ORAL DAILY
Qty: 90 TABLET | Refills: 1 | Status: SHIPPED | OUTPATIENT
Start: 2024-11-27

## 2024-11-27 RX ORDER — PEN NEEDLE, DIABETIC 32GX 5/32"
NEEDLE, DISPOSABLE MISCELLANEOUS DAILY
Qty: 90 EACH | Refills: 1 | Status: SHIPPED | OUTPATIENT
Start: 2024-11-27

## 2024-11-27 RX ORDER — ACYCLOVIR 400 MG/1
1 TABLET ORAL
Qty: 9 EACH | Refills: 1 | Status: SHIPPED | OUTPATIENT
Start: 2024-11-27

## 2024-11-27 RX ORDER — INSULIN GLARGINE 300 U/ML
30 INJECTION, SOLUTION SUBCUTANEOUS DAILY
Qty: 9 ML | Refills: 1 | Status: SHIPPED | OUTPATIENT
Start: 2024-11-27 | End: 2025-02-25

## 2024-11-27 RX ORDER — GLIPIZIDE 5 MG/1
TABLET ORAL
Qty: 180 TABLET | Refills: 1 | Status: SHIPPED | OUTPATIENT
Start: 2024-11-27

## 2024-11-27 RX ORDER — GLIPIZIDE 10 MG/1
TABLET ORAL
Qty: 180 TABLET | Refills: 2 | Status: CANCELLED | OUTPATIENT
Start: 2024-11-27

## 2024-11-27 RX ORDER — GLIPIZIDE 10 MG/1
TABLET, FILM COATED, EXTENDED RELEASE ORAL
Qty: 90 TABLET | Refills: 1 | Status: CANCELLED | OUTPATIENT
Start: 2024-11-27

## 2024-11-27 NOTE — TELEPHONE ENCOUNTER
Spoke with Mylene and she stated patient is supposed to stay on 5mg. Called patient and spoke with him. He was upset but expressed understanding. I advised pt to remind us to download his Cgm in 2 weeks. He expressed understanding

## 2024-11-27 NOTE — PATIENT INSTRUCTIONS
Hypoglycemia instructions   Mike Caldwell  11/27/2024  [id number]    Low Blood Sugar    Steps to treat low blood sugar.    1. Test blood sugar if you have symptoms of low blood sugar:   Low Blood Sugar Symptoms:  o Sweaty  o Dizzy  o Rapid heartbeat  o Shaky  o Bad mood  o Hungry      2. Treat blood sugar less than 70 with 15 grams of fast-acting carbohydrate:   Examples of 15 grams Fast-Acting Carbohydrate:  o 4 oz juice  o 4 oz regular soda  o 3-4 glucose tablets (chew)  o 3-4 hard candies (chew)          3.  Wait 15 minutes and test your blood sugar again     4. If blood sugar is less than 100, repeat steps 2-3.    5. When your blood sugar is 100 or more, eat a snack if it will be longer than one hour until your next meal. The snack should be 15 grams of carbohydrate and a protein:   Examples of snacks:  o ½ sandwich  o 6 crackers with cheese  o Piece of fruit with cheese or peanut butter  o 6 crackers with peanut butter

## 2024-11-27 NOTE — PROGRESS NOTES
Patient Progress Note      CC: DM      Referring Provider  No referring provider defined for this encounter.     History of Present Illness:   Mike Caldwell is a 59 y.o. male with a history of type 2 diabetes with long term use of insulin. Diabetes course has been stable. Denies recent illness or hospitalizations. Denies recent severe hypoglycemic or severe hyperglycemic episodes. Denies any issues with his current regimen. Home glucose monitoring: are performed regularly, Dexcom     Average glucose 173 mg/dl  In target range 67%, above range 33%, below range 0%    Current regimen: Toujeo 30 units QHS, metformin 1000 mg BID, Ozempic 1 mg weekly, glipizide 5 mg 1/2 tablet with breakfast and 1 tablet with dinner.  compliant most of the time, denies any side effects from medications.  Injects in: abdomen. Rotates sites: Yes  Hypoglycemic episodes: No, rare  H/o of hypoglycemia causing hospitalization or Intervention such as glucagon injection  or ambulance call :  No  Hypoglycemia symptoms:  rarely is under 70 mg/dl, mild jitteriness   Treatment of hypoglycemia: juice    Medic alert tag: recommended: Yes    Diabetes education: Yes  Diet: 3 meals per day, 0-1 snack per day. Timing of meals is predictable.   Diabetic diet compliance:  noncompliant some of the time. States the past few months he was less compliant due to other stressors but is planning on changing his diet again.  Activity: Daily activity is predictable: Yes. He goes for a walk every day. Tries to aim for 8000.     Ophthamology: states he just went Nov 2024; [facility]   Podiatry: states he will be due in Jan 2025. Dr. Collins     Has hypertension: on ACE inhibitor/ARB, compliant most of the time  Has hyperlipidemia: on statin - tolerating well, no myalgias. compliant most of the time, denies any side effects from medications.  Thyroid disorders: No  History of pancreatitis: No    Patient Active Problem List   Diagnosis    Type 2  diabetes mellitus with diabetic neuropathy, with long-term current use of insulin (HCC)    Mixed hyperlipidemia    Hypertension    Diabetic polyneuropathy associated with type 2 diabetes mellitus (HCC)    Lower extremity edema      Past Medical History:   Diagnosis Date    Anxiety     Diabetes mellitus (HCC)     Diabetic eye exam ([facility]) 09/2021    Dyslipidemia     ED (erectile dysfunction)     Hypertension       Past Surgical History:   Procedure Laterality Date    HERNIA REPAIR      scotal, childhood      Family History   Problem Relation Age of Onset    Diabetes Mother     Diabetes type II Mother     Liver disease Father     Diabetes Family      Social History     Tobacco Use    Smoking status: Former     Types: Cigarettes    Smokeless tobacco: Not on file   Substance Use Topics    Alcohol use: Yes     Comment: occasional     No Known Allergies      Current Outpatient Medications:     aspirin 81 mg chewable tablet, Chew 1 tablet (81 mg total) daily, Disp: , Rfl:     BD Pen Needle Maria Eugenia 2nd Gen 32G X 4 MM MISC, Inject under the skin in the morning For use with insulin once daily, Disp: 90 each, Rfl: 1    celecoxib (CeleBREX) 200 mg capsule, , Disp: , Rfl:     Continuous Blood Gluc Sensor (Dexcom G7 Sensor), Use 1 Device every 10 days, Disp: 9 each, Rfl: 3    Continuous Glucose  (Dexcom G7 ) LEAH, Use 1 each if needed (use as dierected), Disp: 1 each, Rfl: 0    Continuous Glucose  (Dexcom G7 ) LEAH, Use 1 Units in the morning Use as directed, Disp: 1 each, Rfl: 0    Continuous Glucose Sensor (Dexcom G7 Sensor), Use 1 Device every 10 days, Disp: 9 each, Rfl: 1    glipiZIDE (GLUCOTROL) 5 mg tablet, Take 1/2 tablet in AM, 1.5 tablets in PM ( this medications needs to be taken with food), Disp: 180 tablet, Rfl: 1    hydroCHLOROthiazide 12.5 mg capsule, Take 1 capsule (12.5 mg total) by mouth daily, Disp: 90 capsule, Rfl: 1    lisinopril (ZESTRIL) 40 mg tablet, Take 1 tablet (40 mg total) by  "mouth daily, Disp: 90 tablet, Rfl: 1    LORazepam (ATIVAN) 2 mg tablet, Take 2 mg by mouth 2 (two) times a day, Disp: , Rfl:     metFORMIN (GLUCOPHAGE) 1000 MG tablet, Take 1 tablet (1,000 mg total) by mouth 2 (two) times a day with meals, Disp: 180 tablet, Rfl: 1    PARoxetine (PAXIL) 20 mg tablet, Take 20 mg by mouth daily, Disp: , Rfl:     rosuvastatin (CRESTOR) 10 MG tablet, Take 1 tablet (10 mg total) by mouth daily, Disp: 90 tablet, Rfl: 1    semaglutide, 1 mg/dose, (Ozempic, 1 MG/DOSE,) 4 mg/3 mL injection pen, Inject 0.75 mL (1 mg total) under the skin every 7 days, Disp: 9 mL, Rfl: 1    tadalafil (CIALIS) 5 MG tablet, , Disp: , Rfl:     Toujeo SoloStar 300 units/mL CONCENTRATED U-300 injection pen (1-unit dial), Inject 30 Units under the skin daily Or as directed TDD up to 30 units daily, Disp: 9 mL, Rfl: 1    cetirizine (ZyrTEC) 10 mg tablet, Take 10 mg by mouth daily (Patient not taking: Reported on 2/7/2024), Disp: , Rfl:   Review of Systems   Constitutional:  Negative for activity change, appetite change, fatigue and unexpected weight change.   HENT:  Negative for trouble swallowing.    Eyes:  Negative for visual disturbance.   Respiratory:  Negative for shortness of breath.    Cardiovascular:  Negative for chest pain and palpitations.   Gastrointestinal:  Negative for constipation and diarrhea.   Endocrine: Negative for polydipsia and polyuria.   Musculoskeletal:  Positive for arthralgias (arthritis).   Skin:  Negative for wound.   Neurological:  Positive for numbness (mild).   Psychiatric/Behavioral: Negative.         Physical Exam:  Body mass index is 48.18 kg/m².  BP (!) 162/102   Pulse 74   Ht 6' 1\" (1.854 m)   Wt (!) 166 kg (365 lb 3.2 oz)   SpO2 97%   BMI 48.18 kg/m²    Wt Readings from Last 3 Encounters:   11/27/24 (!) 166 kg (365 lb 3.2 oz)   06/27/24 (!) 159 kg (350 lb 12.8 oz)   04/30/24 (!) 160 kg (352 lb)       Physical Exam  Vitals and nursing note reviewed.   Constitutional:       " Appearance: He is well-developed.   HENT:      Head: Normocephalic.   Eyes:      General: No scleral icterus.  Neck:      Thyroid: No thyromegaly.   Cardiovascular:      Rate and Rhythm: Normal rate and regular rhythm.      Pulses:           Radial pulses are 2+ on the right side and 2+ on the left side.      Heart sounds: No murmur heard.  Pulmonary:      Effort: Pulmonary effort is normal. No respiratory distress.      Breath sounds: Normal breath sounds. No wheezing.   Musculoskeletal:      Cervical back: Neck supple.   Skin:     General: Skin is warm and dry.   Neurological:      Mental Status: He is alert.           Labs:   Component      Latest Ref Rng 1/30/2024 6/20/2024 11/20/2024   Sodium      135 - 147 mmol/L 135  137  136    Potassium      3.5 - 5.3 mmol/L 4.7  4.7  4.8    Chloride      96 - 108 mmol/L 104  103  104    Carbon Dioxide      21 - 32 mmol/L 25  26  26    ANION GAP      4 - 13 mmol/L 6  8  6    BUN      5 - 25 mg/dL 30 (H)  27 (H)  23    Creatinine      0.60 - 1.30 mg/dL 1.05  0.92  0.95    GLUCOSE, FASTING      65 - 99 mg/dL 117 (H)  109 (H)  102 (H)    Calcium      8.4 - 10.2 mg/dL 9.6  9.2  9.4    AST      13 - 39 U/L   22    ALT      7 - 52 U/L   25    ALK PHOS      34 - 104 U/L   49    Total Protein      6.4 - 8.4 g/dL   7.1    Albumin      3.5 - 5.0 g/dL   4.1    Total Bilirubin      0.20 - 1.00 mg/dL   0.46    GFR, Calculated      ml/min/1.73sq m 77  91  87    Cholesterol      See Comment mg/dL 129      Triglycerides      See Comment mg/dL 181 (H)      HDL      >=40 mg/dL 43      LDL Calculated      0 - 100 mg/dL 50      Non-HDL Cholesterol      mg/dl 86      EXT Creatinine Urine      Reference range not established. mg/dL 99.6   104.7    Albumin,U,Random      <20.0 mg/L 48.3 (H)   40.8 (H)    Albumin Creat Ratio      0 - 30 mg/g creatinine 48 (H)   39 (H)    Hemoglobin A1C      Normal 4.0-5.6%; PreDiabetic 5.7-6.4%; Diabetic >=6.5%; Glycemic control for adults with diabetes <7.0% % 6.5  (H)  6.7 (H)  7.4 (H)    eAG, EST AVG Glucose      mg/dl 140  146  166    TSH 3RD GENERATON      0.450 - 4.500 uIU/mL 2.406         Legend:  (H) High      Plan:    Diagnoses and all orders for this visit:    Type 2 diabetes mellitus with diabetic neuropathy, with long-term current use of insulin (HCC)  HGA1C 7.4%. Worsened.  Treatment regimen: increase glipizide at dinner to 1.5 tablets. Continue other treatments.  Discussed intensive insulin regimen does increase risk for hypoglycemia. Episodes of hypoglycemia can lead to permanent disability and death.  Discussed risks/complications associated with uncontrolled diabetes.    Advised to adhere to diabetic diet, and recommended staying active/exercising routinely as tolerated.  Keep carbohydrates consistent to limit blood glucose fluctuations.  Advised to call if blood sugars less than 70 mg/dl or over 300 mg/dl.   Check blood glucose 3+ times a day  Discussed symptoms and treatment of hypoglycemia.   Recommended routine follow-up with podiatry and ophthalmology.   Ordered blood work to complete prior to next visit.  -     Hemoglobin A1C; Future  -     Albumin / creatinine urine ratio; Future  -     Basic metabolic panel; Future  -     glipiZIDE (GLUCOTROL) 5 mg tablet; Take 1/2 tablet in AM, 1.5 tablets in PM ( this medications needs to be taken with food)  -     semaglutide, 1 mg/dose, (Ozempic, 1 MG/DOSE,) 4 mg/3 mL injection pen; Inject 0.75 mL (1 mg total) under the skin every 7 days  -     BD Pen Needle Maria Eugenia 2nd Gen 32G X 4 MM MISC; Inject under the skin in the morning For use with insulin once daily  -     metFORMIN (GLUCOPHAGE) 1000 MG tablet; Take 1 tablet (1,000 mg total) by mouth 2 (two) times a day with meals  -     Continuous Glucose Sensor (Dexcom G7 Sensor); Use 1 Device every 10 days  -     Toujeo SoloStar 300 units/mL CONCENTRATED U-300 injection pen (1-unit dial); Inject 30 Units under the skin daily Or as directed TDD up to 30 units  daily    Primary hypertension  Blood pressure is elevated. State BP this morning was within normal range  Advised to monitor BP at home and follow-up with PCP if remaining elevated, at or above 140/90  For now continue current treatment  -     lisinopril (ZESTRIL) 40 mg tablet; Take 1 tablet (40 mg total) by mouth daily  -     hydroCHLOROthiazide 12.5 mg capsule; Take 1 capsule (12.5 mg total) by mouth daily    Mixed hyperlipidemia  LDL previously 50  On statin therapy  -     rosuvastatin (CRESTOR) 10 MG tablet; Take 1 tablet (10 mg total) by mouth daily  -     Lipid panel; Future        Discussed with the patient diagnosis and treatment and all questions fully answered. He will call me if any problems arise.    Counseled patient on diagnostic results, prognosis, risk and benefit of treatment options, instruction for management, importance of treatment compliance, risk factor reduction and impressions.      Mylene Aldrich PA-C

## 2024-11-27 NOTE — TELEPHONE ENCOUNTER
Spoke with patient and he stated his medication is supposed to be 10mg. I told him I would speak with provider and send in the new correct script. He expressed understanding

## 2024-11-27 NOTE — TELEPHONE ENCOUNTER
Patient called stating he noticed that the script sent to [facility] for Glipizide is for 5 mg tablets.  He states he has always been on glipizide 10 mg tablets. He is asking for the 10 mg tablets with new instructions to be sent to [facility] for a 90 day supply. Please call him once sent

## 2024-11-27 NOTE — TELEPHONE ENCOUNTER
Patient called requesting refill for Toujeo SoloStar 300 units/mL CONCENTRATED U-300 injection pen (1-unit dial) . Patient made aware medication was refilled on 11/27/2024 for 9 mL with 1 refills to [facility]. Patient instructed to contact the pharmacy to obtain refills of medication. Patient verbalized understanding.

## 2024-11-27 NOTE — TELEPHONE ENCOUNTER
Patient called in again trying to verify if the correct dosage was sent to the pharmacy. I informed pt of what was current;y there. Please switch rx to the 10mg of Glipizide. Pt states that that is what he was on previously when he used to see Dr. Putnam and to take a look at what the previous prescription was. He believes that that is why his sugars are all over

## 2024-11-27 NOTE — TELEPHONE ENCOUNTER
I reviewed the glipizide prescriptions sent by Dr. Putnam in the past and they were 5 mg tablets. Please continue with the current instructions as advised during the office visit. In a couple weeks, he can contact us to download the sensor again and if readings remain elevated, I can further adjust the dose.

## 2025-03-25 ENCOUNTER — RESULTS FOLLOW-UP (OUTPATIENT)
Dept: ENDOCRINOLOGY | Facility: CLINIC | Age: 60
End: 2025-03-25

## 2025-03-25 ENCOUNTER — APPOINTMENT (OUTPATIENT)
Dept: LAB | Facility: HOSPITAL | Age: 60
End: 2025-03-25
Payer: COMMERCIAL

## 2025-03-25 DIAGNOSIS — E78.2 MIXED HYPERLIPIDEMIA: ICD-10-CM

## 2025-03-25 DIAGNOSIS — Z79.4 TYPE 2 DIABETES MELLITUS WITH DIABETIC NEUROPATHY, WITH LONG-TERM CURRENT USE OF INSULIN (HCC): ICD-10-CM

## 2025-03-25 DIAGNOSIS — E11.40 TYPE 2 DIABETES MELLITUS WITH DIABETIC NEUROPATHY, WITH LONG-TERM CURRENT USE OF INSULIN (HCC): ICD-10-CM

## 2025-03-25 LAB
ANION GAP SERPL CALCULATED.3IONS-SCNC: 8 MMOL/L (ref 4–13)
BUN SERPL-MCNC: 25 MG/DL (ref 5–25)
CALCIUM SERPL-MCNC: 9.3 MG/DL (ref 8.4–10.2)
CHLORIDE SERPL-SCNC: 101 MMOL/L (ref 96–108)
CHOLEST SERPL-MCNC: 139 MG/DL (ref ?–200)
CO2 SERPL-SCNC: 28 MMOL/L (ref 21–32)
CREAT SERPL-MCNC: 1.01 MG/DL (ref 0.6–1.3)
EST. AVERAGE GLUCOSE BLD GHB EST-MCNC: 160 MG/DL
GFR SERPL CREATININE-BSD FRML MDRD: 81 ML/MIN/1.73SQ M
GLUCOSE P FAST SERPL-MCNC: 83 MG/DL (ref 65–99)
HBA1C MFR BLD: 7.2 %
HDLC SERPL-MCNC: 49 MG/DL
LDLC SERPL CALC-MCNC: 54 MG/DL (ref 0–100)
NONHDLC SERPL-MCNC: 90 MG/DL
POTASSIUM SERPL-SCNC: 4.3 MMOL/L (ref 3.5–5.3)
SODIUM SERPL-SCNC: 137 MMOL/L (ref 135–147)
TRIGL SERPL-MCNC: 181 MG/DL (ref ?–150)

## 2025-03-25 PROCEDURE — 80061 LIPID PANEL: CPT

## 2025-03-25 PROCEDURE — 80048 BASIC METABOLIC PNL TOTAL CA: CPT

## 2025-03-25 PROCEDURE — 82570 ASSAY OF URINE CREATININE: CPT

## 2025-03-25 PROCEDURE — 83036 HEMOGLOBIN GLYCOSYLATED A1C: CPT

## 2025-03-25 PROCEDURE — 82043 UR ALBUMIN QUANTITATIVE: CPT

## 2025-03-25 PROCEDURE — 36415 COLL VENOUS BLD VENIPUNCTURE: CPT

## 2025-03-26 LAB
CREAT UR-MCNC: 85.4 MG/DL
MICROALBUMIN UR-MCNC: 45.2 MG/L
MICROALBUMIN/CREAT 24H UR: 53 MG/G CREATININE (ref 0–30)

## 2025-03-27 ENCOUNTER — TELEPHONE (OUTPATIENT)
Dept: ENDOCRINOLOGY | Facility: CLINIC | Age: 60
End: 2025-03-27

## 2025-04-02 ENCOUNTER — TELEPHONE (OUTPATIENT)
Dept: ENDOCRINOLOGY | Facility: CLINIC | Age: 60
End: 2025-04-02

## 2025-04-02 ENCOUNTER — OFFICE VISIT (OUTPATIENT)
Dept: ENDOCRINOLOGY | Facility: CLINIC | Age: 60
End: 2025-04-02
Payer: COMMERCIAL

## 2025-04-02 VITALS
OXYGEN SATURATION: 98 % | DIASTOLIC BLOOD PRESSURE: 88 MMHG | WEIGHT: 315 LBS | HEART RATE: 74 BPM | BODY MASS INDEX: 41.75 KG/M2 | SYSTOLIC BLOOD PRESSURE: 148 MMHG | HEIGHT: 73 IN

## 2025-04-02 DIAGNOSIS — E11.40 TYPE 2 DIABETES MELLITUS WITH DIABETIC NEUROPATHY, WITH LONG-TERM CURRENT USE OF INSULIN (HCC): Primary | ICD-10-CM

## 2025-04-02 DIAGNOSIS — E11.42 DIABETIC POLYNEUROPATHY ASSOCIATED WITH TYPE 2 DIABETES MELLITUS (HCC): ICD-10-CM

## 2025-04-02 DIAGNOSIS — Z79.4 TYPE 2 DIABETES MELLITUS WITH DIABETIC NEUROPATHY, WITH LONG-TERM CURRENT USE OF INSULIN (HCC): ICD-10-CM

## 2025-04-02 DIAGNOSIS — I10 PRIMARY HYPERTENSION: ICD-10-CM

## 2025-04-02 DIAGNOSIS — E11.40 TYPE 2 DIABETES MELLITUS WITH DIABETIC NEUROPATHY, WITH LONG-TERM CURRENT USE OF INSULIN (HCC): ICD-10-CM

## 2025-04-02 DIAGNOSIS — E78.2 MIXED HYPERLIPIDEMIA: ICD-10-CM

## 2025-04-02 DIAGNOSIS — Z79.4 TYPE 2 DIABETES MELLITUS WITH DIABETIC NEUROPATHY, WITH LONG-TERM CURRENT USE OF INSULIN (HCC): Primary | ICD-10-CM

## 2025-04-02 PROCEDURE — 95251 CONT GLUC MNTR ANALYSIS I&R: CPT | Performed by: INTERNAL MEDICINE

## 2025-04-02 PROCEDURE — 99215 OFFICE O/P EST HI 40 MIN: CPT | Performed by: INTERNAL MEDICINE

## 2025-04-02 RX ORDER — INSULIN GLARGINE 300 U/ML
INJECTION, SOLUTION SUBCUTANEOUS
Qty: 9 ML | Refills: 1 | Status: SHIPPED | OUTPATIENT
Start: 2025-04-02

## 2025-04-02 RX ORDER — LISINOPRIL 40 MG/1
40 TABLET ORAL DAILY
Qty: 90 TABLET | Refills: 1 | Status: SHIPPED | OUTPATIENT
Start: 2025-04-02 | End: 2025-07-01

## 2025-04-02 RX ORDER — HYDROCHLOROTHIAZIDE 12.5 MG/1
12.5 CAPSULE ORAL DAILY
Qty: 90 CAPSULE | Refills: 1 | Status: SHIPPED | OUTPATIENT
Start: 2025-04-02

## 2025-04-02 RX ORDER — ACYCLOVIR 400 MG/1
1 TABLET ORAL
Qty: 9 EACH | Refills: 1 | Status: SHIPPED | OUTPATIENT
Start: 2025-04-02

## 2025-04-02 RX ORDER — GLIPIZIDE 10 MG/1
TABLET, FILM COATED, EXTENDED RELEASE ORAL
Qty: 180 TABLET | Refills: 1 | Status: SHIPPED | OUTPATIENT
Start: 2025-04-02

## 2025-04-02 RX ORDER — ACYCLOVIR 400 MG/1
TABLET ORAL
COMMUNITY

## 2025-04-02 RX ORDER — PEN NEEDLE, DIABETIC 32GX 5/32"
NEEDLE, DISPOSABLE MISCELLANEOUS DAILY
Qty: 90 EACH | Refills: 1 | Status: SHIPPED | OUTPATIENT
Start: 2025-04-02

## 2025-04-02 RX ORDER — ROSUVASTATIN CALCIUM 10 MG/1
10 TABLET, COATED ORAL DAILY
Qty: 90 TABLET | Refills: 1 | Status: SHIPPED | OUTPATIENT
Start: 2025-04-02

## 2025-04-02 NOTE — TELEPHONE ENCOUNTER
Reason for call:   [x] Refill   [] Prior Auth  [] Other:     Office:   [] PCP/Provider -   [x] Specialty/Provider - [facility] [place]     Medication:     BD Pen Needle Maria Eugenia 2nd Gen 32G X 4 MM MISC       Dose/Frequency: 1 time daily    Quantity: 90    Pharmacy: [facility]   Does the patient have enough for 3 days?   [x] Yes   [] No - Send as HP to POD    Mail Away Pharmacy   Does the patient have enough for 10 days?   [x] Yes   [] No - Send as HP to POD

## 2025-04-02 NOTE — ASSESSMENT & PLAN NOTE
Blood pressure well-controlled usually today slightly elevated.  Discussed the goal for blood pressure.  As per patient at home blood sugars is usually below 130/80 mmHg  Will continue to monitor.  Orders:    hydroCHLOROthiazide 12.5 mg capsule; Take 1 capsule (12.5 mg total) by mouth daily    lisinopril (ZESTRIL) 40 mg tablet; Take 1 tablet (40 mg total) by mouth daily

## 2025-04-02 NOTE — TELEPHONE ENCOUNTER
Patient called the Rxline back and wants the to make sure the medication goes to [facility] and wants a call back as soon as this is done

## 2025-04-02 NOTE — ASSESSMENT & PLAN NOTE
Lab Results   Component Value Date    LDLCALC 54 03/25/2025     Currently managed on Crestor 10 mg daily.  LDL well-controlled  Orders:    rosuvastatin (CRESTOR) 10 MG tablet; Take 1 tablet (10 mg total) by mouth daily

## 2025-04-02 NOTE — TELEPHONE ENCOUNTER
Patient called the RX Refill Line. Message is being forwarded to the office.     Patient was supposed to get a ozempic 2 mg but the 1 mg was sent instead. He would like a call once this is corrected    Please contact patient at [phone]

## 2025-04-02 NOTE — PROGRESS NOTES
Name: Mike Caldwell      : 1965      MRN: [id number]  Encounter Provider: Michelle Buitrago MD  Encounter Date: 2025   Encounter department: [facility] [place]    No chief complaint on file.  :  Assessment & Plan  Type 2 diabetes mellitus with diabetic neuropathy, with long-term current use of insulin ([facility])    Lab Results   Component Value Date    HGBA1C 7.2 (H) 2025   A1c 7.2%, uncontrolled.  Goal for A1c is 6.5%  Increase Ozempic to 2 mg once a week.  Discussed about side effects and call if he notices any.  So far he does not have any side effects.  Reduce Toujeo to 25 units at bedtime because of downward trend in the morning and blood sugars.  Discontinue glipizide in the morning, increase glipizide to 10 mg with dinner.  Educated about side effects and benefits of all medications.       Recommended a consistent carbohydrate diet   - weight control and exercise as discussed ( ideal is 30 min, at least 5 times a week)   - home glucose monitoring and goals emphasized, requested patient bring in glucose monitor or log sheets to next visit   - discussed signs and symptoms of hypoglycemia and how to correct them appropriately  - counseled about the long term complications of uncontrolled diabetes, including, Nephropathy, Neuropathy, CVD, Retinopathy and importance  of adherence to diet, treatment plan and life style modifications   - importance of following up with Opthalmology and podiatry   - glycohemoglobin and other lab monitoring discussed, A1c goal value reviewed  - long term diabetic complications discussed     Orders:    semaglutide, 1 mg/dose, (Ozempic, 1 MG/DOSE,) 4 mg/3 mL injection pen; Inject 0.75 mL (1 mg total) under the skin every 7 days    metFORMIN (GLUCOPHAGE) 1000 MG tablet; Take 1 tablet (1,000 mg total) by mouth 2 (two) times a day with meals    glipiZIDE (GLUCOTROL XL) 10 mg 24 hr tablet; Take one tablet with dinner    Continuous  Glucose Sensor (Dexcom G7 Sensor); Use 1 Device every 10 days    Toujeo SoloStar 300 units/mL CONCENTRATED U-300 injection pen (1-unit dial); Inject 25 units at night    Basic metabolic panel; Future    Hemoglobin A1C; Future    C-peptide; Future    Albumin / creatinine urine ratio; Future    Primary hypertension  Blood pressure well-controlled usually today slightly elevated.  Discussed the goal for blood pressure.  As per patient at home blood sugars is usually below 130/80 mmHg  Will continue to monitor.  Orders:    hydroCHLOROthiazide 12.5 mg capsule; Take 1 capsule (12.5 mg total) by mouth daily    lisinopril (ZESTRIL) 40 mg tablet; Take 1 tablet (40 mg total) by mouth daily    Mixed hyperlipidemia  Lab Results   Component Value Date    LDLCALC 54 03/25/2025     Currently managed on Crestor 10 mg daily.  LDL well-controlled  Orders:    rosuvastatin (CRESTOR) 10 MG tablet; Take 1 tablet (10 mg total) by mouth daily    Diabetic polyneuropathy associated with type 2 diabetes mellitus (HCC)    Lab Results   Component Value Date    HGBA1C 7.2 (H) 03/25/2025   Symptoms well-controlled, follows with podiatry every 6 months.             History of Present Illness   History of Present Illness    Mike Caldwell is a 59 y.o. male with type 2 diabetes seen in follow up. Reports complications of neuropathy. Denies recent severe hypoglycemic or severe hyperglycemic episodes. Denies any issues with his current regimen. Last A1C was   Lab Results   Component Value Date    HGBA1C 7.2 (H) 03/25/2025     . Denies recent illness, hospitalization or steroid use.     CGM Interpretation:  Date Range: March 28, 2025 to  April 2, 2025  Device used: Dexcom clarity  Option - Home use yes Professional Use- Physician Provided Equipment  Option - Indication: Type 2 Diabetes  Analysis of data:   Average Glucose: 161 mg per DL  GMI: 7.22%  Coefficient of Variation: 37.5%  SD: Not available mg/dL  Glucose Variability: 37.5%  Time in Target  Range: 65%  Time Above Range: 124 0% high, 11% very high  Time Below Range: 0% low, 0% very low  Interpretation of data: Patient has pattern of elevated blood sugars usually in between 3 PM to 10 PM, especially after dinner.  Blood sugars are trending down from 12 AM to 6 AM  More than 72 hours of data was reviewed. Report to be scanned to chart.     Current regimen:   Glipizide XL 5 mg daily, metformin 1000 mg twice a day, Ozempic 1 mg once a week, Toujeo 30 units daily at bedtime      Last Eye Exam: Not on file Last appt, 11/2024 ( Dr. Julieth Dueñas)   Last Foot Exam: 12/29/2022 , poo foot and ankle   Health Maintenance   Topic Date Due    Diabetic Foot Exam  06/27/2025 (Originally 12/29/2023)    Diabetic Eye Exam  06/27/2025 (Originally 9/9/1975)     Pertinent Medical History         Review of Systems   Constitutional:  Negative for activity change, diaphoresis, fatigue, fever and unexpected weight change.   HENT: Negative.     Eyes:  Negative for visual disturbance.   Respiratory:  Negative for cough, chest tightness and shortness of breath.    Cardiovascular:  Negative for chest pain, palpitations and leg swelling.   Gastrointestinal:  Negative for abdominal pain, blood in stool, constipation, diarrhea, nausea and vomiting.   Endocrine: Negative for cold intolerance, heat intolerance, polydipsia, polyphagia and polyuria.   Genitourinary:  Negative for dysuria, enuresis, frequency and urgency.   Musculoskeletal:  Negative for arthralgias and myalgias.   Skin:  Negative for pallor, rash and wound.   Allergic/Immunologic: Negative.    Neurological:  Negative for dizziness, tremors, weakness and numbness.   Hematological: Negative.    Psychiatric/Behavioral: Negative.      as per HPI    Current Outpatient Medications on File Prior to Visit   Medication Sig Dispense Refill    aspirin 81 mg chewable tablet Chew 1 tablet (81 mg total) daily      BD Pen Needle Maria Eugenia 2nd Gen 32G X 4 MM MISC Inject under the skin in the  morning For use with insulin once daily 90 each 1    celecoxib (CeleBREX) 200 mg capsule       Continuous Blood Gluc Sensor (Dexcom G7 Sensor) Use 1 Device every 10 days 9 each 3    Continuous Glucose  (Dexcom G7 ) LEAH Use 1 each if needed (use as dierected) 1 each 0    Continuous Glucose  (Dexcom G7 ) LEAH Use 1 Units in the morning Use as directed 1 each 0    Continuous Glucose Sensor (Dexcom G7 Sensor)       LORazepam (ATIVAN) 2 mg tablet Take 2 mg by mouth 2 (two) times a day      PARoxetine (PAXIL) 20 mg tablet Take 20 mg by mouth daily      tadalafil (CIALIS) 5 MG tablet       [DISCONTINUED] Continuous Glucose Sensor (Dexcom G7 Sensor) Use 1 Device every 10 days 9 each 1    [DISCONTINUED] glipiZIDE (GLUCOTROL) 5 mg tablet Take 1/2 tablet in AM, 1.5 tablets in PM ( this medications needs to be taken with food) 180 tablet 1    [DISCONTINUED] hydroCHLOROthiazide 12.5 mg capsule Take 1 capsule (12.5 mg total) by mouth daily 90 capsule 1    [DISCONTINUED] metFORMIN (GLUCOPHAGE) 1000 MG tablet Take 1 tablet (1,000 mg total) by mouth 2 (two) times a day with meals 180 tablet 1    [DISCONTINUED] rosuvastatin (CRESTOR) 10 MG tablet Take 1 tablet (10 mg total) by mouth daily 90 tablet 1    [DISCONTINUED] semaglutide, 1 mg/dose, (Ozempic, 1 MG/DOSE,) 4 mg/3 mL injection pen Inject 0.75 mL (1 mg total) under the skin every 7 days 9 mL 1    [DISCONTINUED] Toclaudia SoloStar 300 units/mL CONCENTRATED U-300 injection pen (1-unit dial) Inject 30 Units under the skin daily Or as directed TDD up to 30 units daily 9 mL 1    cetirizine (ZyrTEC) 10 mg tablet Take 10 mg by mouth daily (Patient not taking: Reported on 2/7/2024)      [DISCONTINUED] lisinopril (ZESTRIL) 40 mg tablet Take 1 tablet (40 mg total) by mouth daily 90 tablet 1     No current facility-administered medications on file prior to visit.         Medical History Reviewed by provider this encounter:     .    Objective   /88 (BP  "Location: Left arm, Patient Position: Sitting, Cuff Size: Large)   Pulse 74   Ht 6' 1\" (1.854 m)   Wt (!) 165 kg (364 lb)   SpO2 98%   BMI 48.02 kg/m²      Body mass index is 48.02 kg/m².  Wt Readings from Last 3 Encounters:   04/02/25 (!) 165 kg (364 lb)   11/27/24 (!) 166 kg (365 lb 3.2 oz)   06/27/24 (!) 159 kg (350 lb 12.8 oz)     Physical Exam    Physical Exam  Vitals reviewed.   Constitutional:       Appearance: Normal appearance. He is obese.   Cardiovascular:      Rate and Rhythm: Normal rate and regular rhythm.   Pulmonary:      Effort: Pulmonary effort is normal.      Breath sounds: Normal breath sounds.   Musculoskeletal:         General: Normal range of motion.      Cervical back: Normal range of motion.      Right lower leg: No edema.      Left lower leg: No edema.   Skin:     General: Skin is warm.   Neurological:      General: No focal deficit present.      Mental Status: He is alert and oriented to person, place, and time.   Psychiatric:         Mood and Affect: Mood normal.         Behavior: Behavior normal.       Results    Labs:   Lab Results   Component Value Date    HGBA1C 7.2 (H) 03/25/2025    HGBA1C 7.4 (H) 11/20/2024    HGBA1C 6.7 (H) 06/20/2024     Lab Results   Component Value Date    CREATININE 1.01 03/25/2025    CREATININE 0.95 11/20/2024    CREATININE 0.92 06/20/2024    BUN 25 03/25/2025    K 4.3 03/25/2025     03/25/2025    CO2 28 03/25/2025     eGFR   Date Value Ref Range Status   03/25/2025 81 ml/min/1.73sq m Final     Lab Results   Component Value Date    HDL 49 03/25/2025    TRIG 181 (H) 03/25/2025     Lab Results   Component Value Date    ALT 25 11/20/2024    AST 22 11/20/2024    ALKPHOS 49 11/20/2024     Lab Results   Component Value Date    EBB7NQRDLBHR 2.406 01/30/2024    IEW3RNOPMXPT 1.712 12/12/2022    LUM3QADHLUAO 2.751 08/19/2022       There are no Patient Instructions on file for this visit.    Discussed with the patient and all questioned fully answered. He will " call me if any problems arise.    Administrative Statements   I have spent a total time of 40  minutes in caring for this patient on the day of the visit/encounter including Diagnostic results, Prognosis, Risks and benefits of tx options, Instructions for management, Patient and family education, Importance of tx compliance, Risk factor reductions, Impressions, Counseling / Coordination of care, Documenting in the medical record, Reviewing/placing orders in the medical record (including tests, medications, and/or procedures), and Obtaining or reviewing history  .

## 2025-04-02 NOTE — ASSESSMENT & PLAN NOTE
Lab Results   Component Value Date    HGBA1C 7.2 (H) 03/25/2025   Symptoms well-controlled, follows with podiatry every 6 months.

## 2025-04-02 NOTE — ASSESSMENT & PLAN NOTE
Lab Results   Component Value Date    HGBA1C 7.2 (H) 03/25/2025   A1c 7.2%, uncontrolled.  Goal for A1c is 6.5%  Increase Ozempic to 2 mg once a week.  Discussed about side effects and call if he notices any.  So far he does not have any side effects.  Reduce Toujeo to 25 units at bedtime because of downward trend in the morning and blood sugars.  Discontinue glipizide in the morning, increase glipizide to 10 mg with dinner.  Educated about side effects and benefits of all medications.       Recommended a consistent carbohydrate diet   - weight control and exercise as discussed ( ideal is 30 min, at least 5 times a week)   - home glucose monitoring and goals emphasized, requested patient bring in glucose monitor or log sheets to next visit   - discussed signs and symptoms of hypoglycemia and how to correct them appropriately  - counseled about the long term complications of uncontrolled diabetes, including, Nephropathy, Neuropathy, CVD, Retinopathy and importance  of adherence to diet, treatment plan and life style modifications   - importance of following up with Opthalmology and podiatry   - glycohemoglobin and other lab monitoring discussed, A1c goal value reviewed  - long term diabetic complications discussed     Orders:    semaglutide, 1 mg/dose, (Ozempic, 1 MG/DOSE,) 4 mg/3 mL injection pen; Inject 0.75 mL (1 mg total) under the skin every 7 days    metFORMIN (GLUCOPHAGE) 1000 MG tablet; Take 1 tablet (1,000 mg total) by mouth 2 (two) times a day with meals    glipiZIDE (GLUCOTROL XL) 10 mg 24 hr tablet; Take one tablet with dinner    Continuous Glucose Sensor (Dexcom G7 Sensor); Use 1 Device every 10 days    Toujeo SoloStar 300 units/mL CONCENTRATED U-300 injection pen (1-unit dial); Inject 25 units at night    Basic metabolic panel; Future    Hemoglobin A1C; Future    C-peptide; Future    Albumin / creatinine urine ratio; Future

## 2025-04-18 ENCOUNTER — TELEPHONE (OUTPATIENT)
Dept: ENDOCRINOLOGY | Facility: CLINIC | Age: 60
End: 2025-04-18

## 2025-06-12 DIAGNOSIS — E11.40 TYPE 2 DIABETES MELLITUS WITH DIABETIC NEUROPATHY, WITH LONG-TERM CURRENT USE OF INSULIN (HCC): ICD-10-CM

## 2025-06-12 DIAGNOSIS — Z79.4 TYPE 2 DIABETES MELLITUS WITH DIABETIC NEUROPATHY, WITH LONG-TERM CURRENT USE OF INSULIN (HCC): ICD-10-CM

## 2025-06-12 RX ORDER — ACYCLOVIR 400 MG/1
TABLET ORAL
Qty: 1 EACH | Refills: 1 | Status: SHIPPED | OUTPATIENT
Start: 2025-06-12

## 2025-08-19 ENCOUNTER — APPOINTMENT (OUTPATIENT)
Dept: LAB | Facility: HOSPITAL | Age: 60
End: 2025-08-19
Payer: COMMERCIAL

## 2025-08-19 DIAGNOSIS — E11.40 TYPE 2 DIABETES MELLITUS WITH DIABETIC NEUROPATHY, WITH LONG-TERM CURRENT USE OF INSULIN (HCC): ICD-10-CM

## 2025-08-19 DIAGNOSIS — Z79.4 TYPE 2 DIABETES MELLITUS WITH DIABETIC NEUROPATHY, WITH LONG-TERM CURRENT USE OF INSULIN (HCC): ICD-10-CM

## 2025-08-19 LAB
ANION GAP SERPL CALCULATED.3IONS-SCNC: 6 MMOL/L (ref 4–13)
BUN SERPL-MCNC: 26 MG/DL (ref 5–25)
CALCIUM SERPL-MCNC: 9.2 MG/DL (ref 8.4–10.2)
CHLORIDE SERPL-SCNC: 106 MMOL/L (ref 96–108)
CO2 SERPL-SCNC: 24 MMOL/L (ref 21–32)
CREAT SERPL-MCNC: 1.03 MG/DL (ref 0.6–1.3)
CREAT UR-MCNC: 68.6 MG/DL
EST. AVERAGE GLUCOSE BLD GHB EST-MCNC: 157 MG/DL
GFR SERPL CREATININE-BSD FRML MDRD: 79 ML/MIN/1.73SQ M
GLUCOSE P FAST SERPL-MCNC: 71 MG/DL (ref 65–99)
HBA1C MFR BLD: 7.1 %
MICROALBUMIN UR-MCNC: 22.2 MG/L
MICROALBUMIN/CREAT 24H UR: 32 MG/G CREATININE (ref 0–30)
POTASSIUM SERPL-SCNC: 5.2 MMOL/L (ref 3.5–5.3)
SODIUM SERPL-SCNC: 136 MMOL/L (ref 135–147)

## 2025-08-19 PROCEDURE — 82043 UR ALBUMIN QUANTITATIVE: CPT

## 2025-08-19 PROCEDURE — 83036 HEMOGLOBIN GLYCOSYLATED A1C: CPT

## 2025-08-19 PROCEDURE — 84681 ASSAY OF C-PEPTIDE: CPT

## 2025-08-19 PROCEDURE — 82570 ASSAY OF URINE CREATININE: CPT

## 2025-08-19 PROCEDURE — 80048 BASIC METABOLIC PNL TOTAL CA: CPT

## 2025-08-19 PROCEDURE — 36415 COLL VENOUS BLD VENIPUNCTURE: CPT

## 2025-08-20 DIAGNOSIS — Z79.4 TYPE 2 DIABETES MELLITUS WITH DIABETIC NEUROPATHY, WITH LONG-TERM CURRENT USE OF INSULIN (HCC): ICD-10-CM

## 2025-08-20 DIAGNOSIS — E11.40 TYPE 2 DIABETES MELLITUS WITH DIABETIC NEUROPATHY, WITH LONG-TERM CURRENT USE OF INSULIN (HCC): ICD-10-CM

## 2025-08-20 LAB — C PEPTIDE SERPL-MCNC: 7.4 NG/ML (ref 1.1–4.4)

## 2025-08-20 RX ORDER — ACYCLOVIR 400 MG/1
1 TABLET ORAL
Qty: 9 EACH | Refills: 3 | Status: SHIPPED | OUTPATIENT
Start: 2025-08-20